# Patient Record
Sex: FEMALE | Race: WHITE | Employment: OTHER | URBAN - METROPOLITAN AREA
[De-identification: names, ages, dates, MRNs, and addresses within clinical notes are randomized per-mention and may not be internally consistent; named-entity substitution may affect disease eponyms.]

---

## 2018-05-29 ENCOUNTER — TRANSFERRED RECORDS (OUTPATIENT)
Dept: HEALTH INFORMATION MANAGEMENT | Facility: CLINIC | Age: 78
End: 2018-05-29

## 2018-10-12 ENCOUNTER — PRE VISIT (OUTPATIENT)
Dept: ORTHOPEDICS | Facility: CLINIC | Age: 78
End: 2018-10-12

## 2018-10-12 NOTE — TELEPHONE ENCOUNTER
Discussed with patient reason for visit Right hip pain  Patient prepared for appointment through the followin. Have you had any recent xrays in the last 6 months? Yes, had at [facility]     2. Have you had an MRI? No.     3. Have you had any surgery in past related to complaint?  No.  If yes, Patient advised that implant stickers are needed for any previous total joint surgery as well for appointment.     4. Are you being referred by another provider? Yes: Dr. Gill  If yes-Records in Epic.    5. Have you received the intake form in mail?.Yes.    6. Is this work comp or MVA related? No.     Susie Cornell RN

## 2018-10-16 ENCOUNTER — OFFICE VISIT (OUTPATIENT)
Dept: ORTHOPEDICS | Facility: CLINIC | Age: 78
End: 2018-10-16
Payer: COMMERCIAL

## 2018-10-16 ENCOUNTER — TELEPHONE (OUTPATIENT)
Dept: ORTHOPEDICS | Facility: CLINIC | Age: 78
End: 2018-10-16

## 2018-10-16 VITALS
BODY MASS INDEX: 31.61 KG/M2 | SYSTOLIC BLOOD PRESSURE: 160 MMHG | TEMPERATURE: 97 F | DIASTOLIC BLOOD PRESSURE: 79 MMHG | HEART RATE: 74 BPM | WEIGHT: 156.8 LBS | OXYGEN SATURATION: 97 % | RESPIRATION RATE: 16 BRPM | HEIGHT: 59 IN

## 2018-10-16 DIAGNOSIS — M16.11 PRIMARY OSTEOARTHRITIS OF RIGHT HIP: Primary | ICD-10-CM

## 2018-10-16 PROCEDURE — 99204 OFFICE O/P NEW MOD 45 MIN: CPT | Performed by: ORTHOPAEDIC SURGERY

## 2018-10-16 RX ORDER — SIMVASTATIN 20 MG
20 TABLET ORAL DAILY
Refills: 0 | COMMUNITY
Start: 2018-09-06

## 2018-10-16 RX ORDER — LISINOPRIL 40 MG/1
40 TABLET ORAL EVERY EVENING
Refills: 0 | Status: ON HOLD | COMMUNITY
Start: 2018-10-09 | End: 2019-04-18

## 2018-10-16 ASSESSMENT — PAIN SCALES - GENERAL: PAINLEVEL: MODERATE PAIN (4)

## 2018-10-16 NOTE — MR AVS SNAPSHOT
After Visit Summary   10/16/2018    Rosemary Mayberry    MRN: [id number]           Patient Information     Date Of Birth          1940        Visit Information        Provider Department      10/16/2018 10:00 AM Adriano Huang MD [facility]        Care Instructions      [facility]  [address] [place] [id number]  Phone [phone]  Fax [phone]    SURGICAL INFORMATION & INSTRUCTIONS  Dr. Adriano Huang  Name of Surgery: Right total hip arthroplasty    Date of Surgery:     If you need to reschedule/schedule your surgery, please contact Karin, our surgery scheduler at [place], at [phone].    Arrival Time:     Time of Surgery:      Please arrive early so that we can prepare you for surgery. If you arrive later than your scheduled arrival time, your surgery may be cancelled.  Please note that scheduled times may change, but you will always be notified if there is a change.     Location of Surgery:     ? [facility], [address]. [address], 3rd floor for check-in  Phone [phone]  Fax [phone]  Bring parking ticket with you for validation and reduced parking rate  [email or web address]    Prior to surgery    ? Call your insurance company  Ask if you need pre-approval for your surgery.  If pre-approval is needed, please call our surgery scheduler for assistance with the pre-approval process.   If you do not have insurance, please let us know.     ? [facility]  10 days before surgery, call [phone] to register with the surgery center. Have your insurance card ready.     Total Joint Replacement:  - Joint Replacement Class:  If you are scheduled to have a joint replacement, you (and any family/friends that will be helping to care for you) are expected to attend an educational class at  least two weeks prior to your surgery.  To register for the class please call the Patient Learning Center at [phone] or register online at [email or web address]. Classes are held at multiple locations as well as online.  You must know the date of your surgery before you can register for a class (approximate date OK). If registering online, please select hip or knee as surgery type as shoulder has not been made an option at this time.     o This is also a good opportunity to think about where you would like to have physical therapy after your surgery. You may also be able to set up appointments in advance so that everything is ready to go after surgery.    - Antibiotics Prophylaxis:  Certain procedures such as dental cleaning/work, colonoscopies and other surgeries can cause bacteria to enter the bloodstream.  These bacteria can attach to joint replacement devices.  To reduce this risk, you will need to take antibiotics before you have invasive procedures or dental work.  This is known as antibiotic prophylaxis.    - Dental Visit:  You may want to schedule an appointment with your dentist for a cleaning or needed work before your surgery.  We advise no dental work or cleaning until 6 months after your surgery with implants to hip(s), knee(s), or shoulder(s).  Once you are 6 months past your surgery, you will need to take prophylactic (preventative) antibiotics for the rest of your life before having any dental cleaning or work.  If dental work is needed before surgery, make sure everything is completely healed prior to surgery.  It may cause delays or cancellation of surgery if not healed completely. This is also for any other invasive procedures you may have such as a colonoscopy.  Please notify the clinic if you have any questions.    ? Schedule an appointment with a Primary Care Provider for a Pre-Op Physical.  This should be done within 30 days of surgery  If you do not have a primary care provider,  you may call [facility] at [phone], for an appointment.  Please have your office note and any labs or tests faxed to the facility where you are having surgery. Please be sure to request a copy of your pre-op physical and bring it with you on the day of surgery.      Tell your provider if you have any of the following:   - IF you have a pacemaker or an ICD (implanted cardiac defibrillator). If you do, please bring the ID card with you on the day of surgery  - IF you're a smoker. People who smoke have a higher risk of infection after surgery. Ask your provider how you can quit smoking.  - If you have diabetes, work with your provider to control your blood sugar. If its not well-controlled, we may need to delay surgery (or you may have problems healing afterward).  - If your surgeon asks you to see your dentist: You'll need to complete any dental work before surgery. Your dentist must send a letter to your surgery center saying it's ok to do the surgery.    ? Blood Bank Pre-Admission order (total joint replacement only):    You will need to have a Blood Type and Screen drawn at a [facility] location before your surgery.  Please check your form included in your packet to determine if it needs to be done 1-30 days before surgery or 1-3 days before surgery.    ? Pre-Op Phone Call  You will receive a pre-op phone call 1-3 days before surgery to review your eating and drinking restrictions, review medications, and confirm surgery times.      ? 7-10 days BEFORE surgery  ? Stop taking aspirin, Plavix or aspirin products 10 days before surgery or as directed by your doctor.  ? Stop taking non-steroidal anti-inflammatory medications (naproxen/Aleve, ibuprofen/Advil/Motrin, celecoxib/Celebrex, meloxicam/Mobic) 1 week before surgery or as directed by your surgeon.  This will reduce the risk of bleeding during surgery.  ? Stop taking fish oil (Omega-3-fatty acid) 1 week before surgery.  ? It is OK to take  acetaminophen (Tylenol) up until 2 hours prior to surgery.  ? Take prescription medications as directed by your doctor.  Discuss which medications to take or hold prior to your surgery, with your primary care doctor.   ? If you have diabetes, ask your primary care doctor or endocrinologist how you should take your medication(s).    ? 24 hours BEFORE surgery  Stop drinking alcohol (beer, wine, liquor) at least 24-hours before and after your surgery.     ? Evening BEFORE surgery  - You may eat a normal meal the night before surgery, but eat nothing after midnight.     - Take a shower - to help wash away bacteria (germs) from your skin.  It s normal to have bacteria on your skin and skin protects us from these germs.  When you have surgery, we cut the skin.  Sometimes germs get into the cuts and cause infection (illness caused by germs).  By following the showering instructions and using the special soap, you will lower the number of germs on your skin.  This decreases your chance of an infection.    - Buy or get 8 ounces of antiseptic surgical soap called 4% CHG.  Common brands of this soap are Hibiclens and Exidine.    - You can find it this soap at your local pharmacy, clinic or retail store.  If you have trouble finding it, ask your pharmacist to help you find the right substitute.    - Do not shave within 12 inches of your incision (surgical cut) area for at least 3 days before surgery.  Shaving can make small cuts in the skin. This puts you at a higher risk of infection.    Items you will need for each shower:   - Newly washed towel  - 4 ounces of one of the recommended soaps    Follow these instructions, the evening before surgery  - Wash your hair and body with your regular shampoo and soap. Make sure you rinse the shampoo and soap from your hair and body.  - Using clean hands, apply about 2 ounces of soap gently on your skin from the neck to your toes. Use on your groin area last. Do not use this soap on your  face or head. If you get any soap in your eyes, ears or mouth, rinse right away.  - Once the soap has been on your skin for at least 1 minute, rinse off completely and repeat washing with the surgical soap one more time.  - Rinse well and dry off using a clean towel.  If you feel any tingling, itching or other irritation, rinse right away. It is normal to feel some coolness on the skin after using the antiseptic soap. Your skin may feel a bit dry after the shower, but do not use any lotions, creams or moisturizers. Do not use hair spray or other products in your hair.  - Dress in freshly washed clothes or pajamas. Use fresh pillowcases and sheets on your bed.    ? Day of Surgery  - You may drink clear liquids up to 2 hours before surgery or as directed by your surgeon.  Clear liquids include: Water, Pedialyte, Gatorade, apple juice and liquids you can read through. Please avoid liquids that are red or orange in color.   - Do NOT drink: milk, coffee, liquids that have pulp, orange juice, and lemonade or tomato juice.   - Do NOT chew gum, chew tobacco or suck on hard candy.    - Take another shower          Follow these instructions:      - Wash your hair and body with your regular shampoo and soap. Make sure you rinse the shampoo and soap from your hair and body.  - Using clean hands, apply about 2 ounces of soap gently on your skin from the neck to your toes. Use on your groin area last. Do not use this soap on your face or head. If you get any soap in your eyes, ears or mouth, rinse right away.  - Once the soap has been on your skin for at least 1 minute, rinse off completely and repeat washing with the surgical soap one more time.  - Rinse well and dry off using a clean towel.  If you feel any tingling, itching or other irritation, rinse right away. It is normal to feel some coolness on the skin after using the antiseptic soap. Your skin may feel a bit dry after the shower, but do not use any lotions, creams or  moisturizers. Do not use hair spray or other products in your hair.  - Dress in freshly washed clothes.  - Do not wear deodorant, cologne, lotion, makeup, nail polish or jewelry to surgery.    ? If there is any change in your health PRIOR to your surgery, please contact your surgeon's office.  Such as a fever, body aches, fatigue, any flu-like symptoms, rash, or any new injury to related body part.    ? Arrange for someone to drive you home after discharge.    will need to be a responsible adult (18 years or older) that will provide transportation to and from surgery and stay in the waiting room during your surgery. You may not drive yourself or take public transportation to and from surgery.    ? Arrange for someone to stay with you for 24 hours after you go home.   This person must be a responsible adult (18 years or older).    ? Bring these items to the hospital/surgery center:   ? Insurance card(s)  ? Money for parking and co-pays, if needed  ? A list of all the medications you take, including vitamins, minerals, herbs and over the counter medications.    ? A copy of your Advance Health Care Directive, if you have one.  This tells us what treatment(s) you would or would not want, and who would make health care decisions, if you could no longer speak for yourself.    ? A case for glasses, contact lenses, hearing aids or dentures.   ? Your inhaler or CPAP machine, if you use these at home    ? Leave extra cash, jewelry and other valuables at home.       ? Other information:   Sleep Apnea: Let your nurse know if you have a history of sleep apnea, only if you are having surgery at the [facility].    When you arrive for surgery  When you get to the surgery center/hospital, you will:  - Check in. If you are under age 18, you must be with a parent or legal guardian.  - Sign consent forms, if you haven t already. These forms state that you know the risks and benefits of surgery. When you sign the  forms, you give us permission to do the surgery. Do not sign them unless you understand what will happen during and after your surgery. If you have any questions about your surgery, ask to speak with your doctor before you sign the forms. If you don t understand the answers, ask again.  - Receive a copy of the Patient s Bill of Rights. If you do not receive a copy, please ask for one.  - Change into hospital clothes. Your belongings will be placed in a bag. We will return them to you after surgery.  - Meet with the anesthesia provider. He or she will tell you what kind of anesthesia (medicine) will be used to keep you comfortable during surgery.  - Remember: it s okay to remind doctors and nurses to wash their hands before touching you.  - In most cases, your surgeon will use a marker to write his or her initials on the surgery site. This ensures that the exact site is operated on.  - For safety reasons, we will ask you the same questions many times. For example, we may ask your name and birth date over and over again.  - Friends and family can stay with you until it s time for surgery. While you re in surgery, they will be in the waiting area. Please note that cell phones are not allowed in some patient care areas.  - If you have questions about what will happen in the operating room, talk to your care team.  - You will meet with an anesthesiologist, before your surgery.  He or she may reference types of anesthesia commonly used for surgeries:   o General:  This involves the use of an IV for injection of medication and anesthesia. You are put into a sleep and have a breathing tube to assist you with breathing.  o Sedation:  You are asleep, but not so deply that you need a breathing tube.   o Local or Regional: a nerve is injected to numb the surgical area.  o Spinal: you are numbed from the waist down with medicine injected into your back.  o Femoral Nerve Block:  Anesthesia injected into the groin of leg which you  are having surgery on.      After surgery  We will move you to a recovery room, where we will watch you closely. If you have any pain or discomfort, tell your nurse. He or she will try to make you comfortable.    - We will move you to your hospital room after you are awake and stable. If you having any pain or discomfort, please let your nursing staff know.  - Please wash your hands every time you touch the wound or change bandages or dressings.  - Do not submerge the wound in water.  You may not use a bathtub, hot tub, pool, or lake until 3 months after surgery. Any open area can be a source of incoming bacteria, which can lead to infection. Keep all dressings clean and dry.   - Elevate your leg(s) as much as possible to help reduce swelling. You will also receive compression stockings for the surgical leg. Please wear these during the day and fully remove them at night. Continue to wear these for approximately 4 weeks after surgery or until your swelling has resolved.  - You may put ice on the surgical area for comfort, keeping ice on area for up to 20 minutes then off for 40 minutes.  You may do this the first few days after surgery to help reduce pain and swelling.    - Drink at least 8-10 glasses of liquid each day to help your body heal.  - Keep your lungs clear by coughing and taking deep breaths every couple hours.  This is especially important the first 48 hours after surgery.  - Typically, you will be able to start driving once you are fully off narcotics and able to do a the quick stop test (slamming on the brake) with your right leg without experiencing much pain.  - You will start physical therapy while in the hospital. Once you leave the hospital, you will need to continue going to physical therapy to maintain and improve your range of motion and strength. Please call the physical therapy clinic of your choice to set up an appointment within 1 week of being discharged from the hospital.    - Notify  your doctor if you have any of the following:   o Fever of 101 F or higher  o Numbness and/or tingling  o Increased pain, redness or swelling  o Drainage from wound  o Prolonged or uncontrolled bleeding  o Difficulty with movement    Range of Motion Restrictions  These are strict for 3 months post surgery, but should be followed for life. As your muscles gain strength, you will notice that you will have more range of motion, but your risk for dislocating remains. See patient education packet for details  - NO bending past 90 degrees at the waist (operative side)   - NO crossing your operative leg over or under your non-operative leg  - NO turning your operative leg inward     Follow-up Appointments, in Clinic  If you don't already have an appointment scheduled, please call to set up an appointment at [phone].      ? Nurse Only Appointment (2 weeks post op)  ? Post-Op appointments with provider (6 weeks post op)    Dealing with pain  A nurse will check your comfort level often during your stay. He or she will work with you to manage your pain.  It s expected that you will have pain after surgery.  Our goal is to reduce or minimize pain by:   - Remember pain is real. There are many ways to control pain. We can help you decide what works best for you.  - Ask for pain medicine when you need it.  Don t try to  tough it out --this can make you feel worse. Always take your medicine as ordered.  - Medicine doesn t work the same for everyone. If your medicine isn t working, tell your nurse. There may be other medicines or treatments we can try.  - Medication Refills.  If you need refills on your pain medication, please call the clinic as soon as possible.  It may take 72-business hours to obtain a refill.  Refills must be picked up at check-in 2, [facility] or mailed to a pharmacy of your choice.    - It is expected that you will wean off the pain medications in a timely manner.   - Constipation is  a common side effect of pain medication, decreased activity and anesthesia from surgery.  Take a stool softener as prescribed by your doctor at the time of discharge.  You may also use over the counter medications as needed.  Be sure to increase your fiber (fruits and vegetables) and your water intake.      Please call the clinic at [phone], if you experience any problems or have questions.  If you are having an emergency, always call 911 or seek immediate evaluation at the Emergency Room.    Thank you for selecting the [facility] for your care!  ---------------------------------------------          Follow-ups after your visit        Who to contact     If you have questions or need follow up information about today's clinic visit or your schedule please contact [facility] directly at [phone].  Normal or non-critical lab and imaging results will be communicated to you by [employer]art, letter or phone within 4 business days after the clinic has received the results. If you do not hear from us within 7 days, please contact the clinic through [employer] or phone. If you have a critical or abnormal lab result, we will notify you by phone as soon as possible.  Submit refill requests through [employer] or call your pharmacy and they will forward the refill request to us. Please allow 3 business days for your refill to be completed.          Additional Information About Your Visit        [employer] Information     [employer] App is an electronic gateway that provides easy, online access to your medical records. With [employer] App, you can request a clinic appointment, read your test results, renew a prescription or communicate with your care team.     To sign up for [employer] visit the website at [email or web address]   You will be asked to enter the access code listed below, as well as some personal information. Please follow the directions to create your username and password.     Your access  "code is: [email or web address]  Expires: 2019 10:38 AM     Your access code will  in 90 days. If you need help or a new code, please contact your [facility] or call [phone] for assistance.        Care EveryWhere ID     This is your Care EveryWhere ID. This could be used by other organizations to access your [facility] medical records  [id number]        Your Vitals Were     Pulse Temperature Respirations Height Pulse Oximetry BMI (Body Mass Index)    74 97  F (36.1  C) (Oral) 16 1.499 m (4' 11\") 97% 31.67 kg/m2       Blood Pressure from Last 3 Encounters:   10/16/18 160/79    Weight from Last 3 Encounters:   10/16/18 71.1 kg (156 lb 12.8 oz)              Today, you had the following     No orders found for display       Primary Care Provider Office Phone # Fax #    [facility] [phone] [address] [facility] [address] [place] [phone]        Equal Access to Services     [facility]: Hadii aad ku hadasho Soomaali, waaxda luqadaha, qaybta kaalmada adeegyada, waxay sarah hayperry dumont . So [facility] [phone].    ATENCIÓN: Si habla español, tiene a soliman disposición servicios gratuitos de asistencia lingüística. Llame al [phone].    We comply with applicable federal civil rights laws and [place] laws. We do not discriminate on the basis of race, color, national origin, age, disability, sex, sexual orientation, or gender identity.            Thank you!     Thank you for choosing [facility]  for your care. Our goal is always to provide you with excellent care. Hearing back from our patients is one way we can continue to improve our services. Please take a few minutes to complete the written survey that you may receive in the mail after your visit with us. Thank you!             Your Updated Medication List - Protect others around you: Learn how to safely use, store and throw away your " medicines at www.disposemymeds.org.          This list is accurate as of 10/16/18 10:38 AM.  Always use your most recent med list.                   Brand Name Dispense Instructions for use Diagnosis    lisinopril 40 MG tablet    PRINIVIL/ZESTRIL     Take 40 mg by mouth daily        simvastatin 20 MG tablet    ZOCOR     Take 20 mg by mouth daily

## 2018-10-16 NOTE — PROGRESS NOTES
Chief Complaint: Follow Up For of the Right Hip (Hip Pain/Limp/NPP 9/26)      Physician:  No ref. provider found    HPI: Rosemary Mayberry is a 77 year old female who presents today for evaluation of her right hip     Symptom Profile  Location of symptoms:  1) low back with radicular symptoms to the thigh, calf and 2) buttock and groin   Onset: insidious  Duration of symptoms: about a year   Quality of symptoms: sharp and burning  Severity: severe  Alleviate: activity modification   Exacerbating:  Activities   Previous Treatments: Previous treatments include activity modification, oral pain medication,     Current Status:  Results of the patient s Hip Disability and Osteoarthritis Outcome Score (HOOS)  are as follows (0-100 scales with 100 being the theoretical best):  Pain: 47.5  Symptoms:65  ADLs:69  Sports/Recreation:37.5  Quality of Life:44  ([email or web address].nu/)  [facility] Activity Score: 4    MEDICAL HISTORY: No past medical history on file.  HTN and hyperlipid    Pertinent negatives:  Patient has no history of DVT or PE. Discussed risk factors.    Medications:     Current Outpatient Prescriptions:      lisinopril (PRINIVIL/ZESTRIL) 40 MG tablet, Take 40 mg by mouth daily, Disp: , Rfl: 0     simvastatin (ZOCOR) 20 MG tablet, Take 20 mg by mouth daily, Disp: , Rfl: 0    Allergies: Review of patient's allergies indicates not on file.    SURGICAL HISTORY: No past surgical history on file.  [place]     FAMILY HISTORY: No family history on file.  Heart trouble    SOCIAL HISTORY:   Social History   Substance Use Topics     Smoking status: Never Smoker     Smokeless tobacco: Never Used     Alcohol use Not on file   retired, was a paraprofessional  Lives with spouse    REVIEW OF SYSTEMS:  The comprehensive review of systems from the intake form was reviewed with the patient.  No fever, weight change or fatigue. No dry eyes. No oral ulcers, sore throat or voice change. No palpitations, syncope, angina or edema.  No chest pain,  "excessive sleepiness, shortness of breath or hemoptysis.   No abdominal pain, nausea, vomiting, diarrhea or heartburn.  No skin rash. No focal weakness or numbness. No bleeding or lymphadenopathy. No rhinitis or hives.     Exam:  On physical examination the patient appears the stated age, is in no acute distress, affectThe is appropriate, and breathing is non-labored.  Vitals are documented in the EMR and have been reviewed:    /79 (BP Location: Left arm, Patient Position: Sitting, Cuff Size: Adult Large)  Pulse 74  Temp 97  F (36.1  C) (Oral)  Resp 16  Ht 1.499 m (4' 11\")  Wt 71.1 kg (156 lb 12.8 oz)  SpO2 97%  BMI 31.67 kg/m2  4' 11\"[Per patient[  Body mass index is 31.67 kg/(m^2).    Rises from chair: easily   Gait: significnat limp on the rigth, [place] and antalgic  Gains the exam table: easily     LEFT hip subjective: not irritated  Abd:  Add:  PFC:  Flexion: 90  IRF:10  ERF:30  Impingement test:-  Resisted straight leg raise:  DORETHA:    RIGHT hip subjective: and irritated   Abd:  Add:  PFC:  Flexion:70  IRF:-25  ERF:30  Impingement test:+ buttock and groin  Resisted straight leg raise:  DORETHA:    Distally, the circulatory, motor, and sensation exam is intact with 5/5 EHL, gastroc-soleus, and tibialis anterior.  Sensation to light touch is intact.  Dorsalis pedis and posterior tibialis pulses are palpable.  There are no sores on the feet, no bruising, and no lymphedema.    X-rays:   End-stage osteoarthritis right hip     Assessment: This is a 77 year old with endstage hip OA and severe symptoms. She also has low back pain and radiculat symptoms. We discussed that these are separate issues with separate treatments though they may overlap in terms of where they hurt and also in that lumbar spine OA affects the body's ROM at the waist, something that can contribute to higher dislocation rates in that population of patients when they have a VINNY. This may be especially so in patietns like michael " that have smaller skeletons and therefor get smaller implants. We spent twenty minutes discussing total hip arthroplasty.  We discussed the implants, the procedure, the risks and benefits, and the post-operative course.  We discussed blood clots, blood clots to the lungs, injury to blood vessels and nerves, dislocation, infection, and leg length difference.  All the patients questions were answered to the best of my ability.      Plan:  Right total hip

## 2018-10-16 NOTE — PATIENT INSTRUCTIONS
[facility]  [address] [place] [id number]  Phone [phone]  Fax [phone]    SURGICAL INFORMATION & INSTRUCTIONS  Dr. Adriano Huang  Name of Surgery: Right total hip arthroplasty    Date of Surgery:     If you need to reschedule/schedule your surgery, please contact Karin, our surgery scheduler at [place], at [phone].    Arrival Time:     Time of Surgery:      Please arrive early so that we can prepare you for surgery. If you arrive later than your scheduled arrival time, your surgery may be cancelled.  Please note that scheduled times may change, but you will always be notified if there is a change.     Location of Surgery:     ? [facility]  [address], 3rd floor for check-in  Phone [phone]  Fax [phone]  Bring parking ticket with you for validation and reduced parking rate  [email or web address]    Prior to surgery    ? Call your insurance company  Ask if you need pre-approval for your surgery.  If pre-approval is needed, please call our surgery scheduler for assistance with the pre-approval process.   If you do not have insurance, please let us know.     ? [place] for Surgery (if on [facility])  10 days before surgery, call [phone] to register with the surgery center. Have your insurance card ready.     Total Joint Replacement:  - Joint Replacement Class:  If you are scheduled to have a joint replacement, you (and any family/friends that will be helping to care for you) are expected to attend an educational class at least two weeks prior to your surgery.  To register for the class please call the Patient Learning Center at [phone] or register online at [email or web address]. Classes are held at multiple locations as well as online.  You must know the date of your surgery before you can register for a class  (approximate date OK). If registering online, please select hip or knee as surgery type as shoulder has not been made an option at this time.     o This is also a good opportunity to think about where you would like to have physical therapy after your surgery. You may also be able to set up appointments in advance so that everything is ready to go after surgery.    - Antibiotics Prophylaxis:  Certain procedures such as dental cleaning/work, colonoscopies and other surgeries can cause bacteria to enter the bloodstream.  These bacteria can attach to joint replacement devices.  To reduce this risk, you will need to take antibiotics before you have invasive procedures or dental work.  This is known as antibiotic prophylaxis.    - Dental Visit:  You may want to schedule an appointment with your dentist for a cleaning or needed work before your surgery.  We advise no dental work or cleaning until 6 months after your surgery with implants to hip(s), knee(s), or shoulder(s).  Once you are 6 months past your surgery, you will need to take prophylactic (preventative) antibiotics for the rest of your life before having any dental cleaning or work.  If dental work is needed before surgery, make sure everything is completely healed prior to surgery.  It may cause delays or cancellation of surgery if not healed completely. This is also for any other invasive procedures you may have such as a colonoscopy.  Please notify the clinic if you have any questions.    ? Schedule an appointment with a Primary Care Provider for a Pre-Op Physical.  This should be done within 30 days of surgery  If you do not have a primary care provider, you may call [facility] at [phone], for an appointment.  Please have your office note and any labs or tests faxed to the facility where you are having surgery. Please be sure to request a copy of your pre-op physical and bring it with you on the day of surgery.      Tell your provider if you  have any of the following:   - IF you have a pacemaker or an ICD (implanted cardiac defibrillator). If you do, please bring the ID card with you on the day of surgery  - IF you're a smoker. People who smoke have a higher risk of infection after surgery. Ask your provider how you can quit smoking.  - If you have diabetes, work with your provider to control your blood sugar. If its not well-controlled, we may need to delay surgery (or you may have problems healing afterward).  - If your surgeon asks you to see your dentist: You'll need to complete any dental work before surgery. Your dentist must send a letter to your surgery center saying it's ok to do the surgery.    ? Blood Bank Pre-Admission order (total joint replacement only):    You will need to have a Blood Type and Screen drawn at a [facility] location before your surgery.  Please check your form included in your packet to determine if it needs to be done 1-30 days before surgery or 1-3 days before surgery.    ? Pre-Op Phone Call  You will receive a pre-op phone call 1-3 days before surgery to review your eating and drinking restrictions, review medications, and confirm surgery times.      ? 7-10 days BEFORE surgery  ? Stop taking aspirin, Plavix or aspirin products 10 days before surgery or as directed by your doctor.  ? Stop taking non-steroidal anti-inflammatory medications (naproxen/Aleve, ibuprofen/Advil/Motrin, celecoxib/Celebrex, meloxicam/Mobic) 1 week before surgery or as directed by your surgeon.  This will reduce the risk of bleeding during surgery.  ? Stop taking fish oil (Omega-3-fatty acid) 1 week before surgery.  ? It is OK to take acetaminophen (Tylenol) up until 2 hours prior to surgery.  ? Take prescription medications as directed by your doctor.  Discuss which medications to take or hold prior to your surgery, with your primary care doctor.   ? If you have diabetes, ask your primary care doctor or endocrinologist how you should  take your medication(s).    ? 24 hours BEFORE surgery  Stop drinking alcohol (beer, wine, liquor) at least 24-hours before and after your surgery.     ? Evening BEFORE surgery  - You may eat a normal meal the night before surgery, but eat nothing after midnight.     - Take a shower - to help wash away bacteria (germs) from your skin.  It s normal to have bacteria on your skin and skin protects us from these germs.  When you have surgery, we cut the skin.  Sometimes germs get into the cuts and cause infection (illness caused by germs).  By following the showering instructions and using the special soap, you will lower the number of germs on your skin.  This decreases your chance of an infection.    - Buy or get 8 ounces of antiseptic surgical soap called 4% CHG.  Common brands of this soap are Hibiclens and Exidine.    - You can find it this soap at your local pharmacy, clinic or retail store.  If you have trouble finding it, ask your pharmacist to help you find the right substitute.    - Do not shave within 12 inches of your incision (surgical cut) area for at least 3 days before surgery.  Shaving can make small cuts in the skin. This puts you at a higher risk of infection.    Items you will need for each shower:   - Newly washed towel  - 4 ounces of one of the recommended soaps    Follow these instructions, the evening before surgery  - Wash your hair and body with your regular shampoo and soap. Make sure you rinse the shampoo and soap from your hair and body.  - Using clean hands, apply about 2 ounces of soap gently on your skin from the neck to your toes. Use on your groin area last. Do not use this soap on your face or head. If you get any soap in your eyes, ears or mouth, rinse right away.  - Once the soap has been on your skin for at least 1 minute, rinse off completely and repeat washing with the surgical soap one more time.  - Rinse well and dry off using a clean towel.  If you feel any tingling, itching or  other irritation, rinse right away. It is normal to feel some coolness on the skin after using the antiseptic soap. Your skin may feel a bit dry after the shower, but do not use any lotions, creams or moisturizers. Do not use hair spray or other products in your hair.  - Dress in freshly washed clothes or pajamas. Use fresh pillowcases and sheets on your bed.    ? Day of Surgery  - You may drink clear liquids up to 2 hours before surgery or as directed by your surgeon.  Clear liquids include: Water, Pedialyte, Gatorade, apple juice and liquids you can read through. Please avoid liquids that are red or orange in color.   - Do NOT drink: milk, coffee, liquids that have pulp, orange juice, and lemonade or tomato juice.   - Do NOT chew gum, chew tobacco or suck on hard candy.    - Take another shower          Follow these instructions:      - Wash your hair and body with your regular shampoo and soap. Make sure you rinse the shampoo and soap from your hair and body.  - Using clean hands, apply about 2 ounces of soap gently on your skin from the neck to your toes. Use on your groin area last. Do not use this soap on your face or head. If you get any soap in your eyes, ears or mouth, rinse right away.  - Once the soap has been on your skin for at least 1 minute, rinse off completely and repeat washing with the surgical soap one more time.  - Rinse well and dry off using a clean towel.  If you feel any tingling, itching or other irritation, rinse right away. It is normal to feel some coolness on the skin after using the antiseptic soap. Your skin may feel a bit dry after the shower, but do not use any lotions, creams or moisturizers. Do not use hair spray or other products in your hair.  - Dress in freshly washed clothes.  - Do not wear deodorant, cologne, lotion, makeup, nail polish or jewelry to surgery.    ? If there is any change in your health PRIOR to your surgery, please contact your surgeon's office.  Such as a  fever, body aches, fatigue, any flu-like symptoms, rash, or any new injury to related body part.    ? Arrange for someone to drive you home after discharge.    will need to be a responsible adult (18 years or older) that will provide transportation to and from surgery and stay in the waiting room during your surgery. You may not drive yourself or take public transportation to and from surgery.    ? Arrange for someone to stay with you for 24 hours after you go home.   This person must be a responsible adult (18 years or older).    ? Bring these items to the hospital/surgery center:   ? Insurance card(s)  ? Money for parking and co-pays, if needed  ? A list of all the medications you take, including vitamins, minerals, herbs and over the counter medications.    ? A copy of your Advance Health Care Directive, if you have one.  This tells us what treatment(s) you would or would not want, and who would make health care decisions, if you could no longer speak for yourself.    ? A case for glasses, contact lenses, hearing aids or dentures.   ? Your inhaler or CPAP machine, if you use these at home    ? Leave extra cash, jewelry and other valuables at home.       ? Other information:   Sleep Apnea: Let your nurse know if you have a history of sleep apnea, only if you are having surgery at the [facility].    When you arrive for surgery  When you get to the surgery center/hospital, you will:  - Check in. If you are under age 18, you must be with a parent or legal guardian.  - Sign consent forms, if you haven t already. These forms state that you know the risks and benefits of surgery. When you sign the forms, you give us permission to do the surgery. Do not sign them unless you understand what will happen during and after your surgery. If you have any questions about your surgery, ask to speak with your doctor before you sign the forms. If you don t understand the answers, ask again.  - Receive a copy  of the Patient s Bill of Rights. If you do not receive a copy, please ask for one.  - Change into hospital clothes. Your belongings will be placed in a bag. We will return them to you after surgery.  - Meet with the anesthesia provider. He or she will tell you what kind of anesthesia (medicine) will be used to keep you comfortable during surgery.  - Remember: it s okay to remind doctors and nurses to wash their hands before touching you.  - In most cases, your surgeon will use a marker to write his or her initials on the surgery site. This ensures that the exact site is operated on.  - For safety reasons, we will ask you the same questions many times. For example, we may ask your name and birth date over and over again.  - Friends and family can stay with you until it s time for surgery. While you re in surgery, they will be in the waiting area. Please note that cell phones are not allowed in some patient care areas.  - If you have questions about what will happen in the operating room, talk to your care team.  - You will meet with an anesthesiologist, before your surgery.  He or she may reference types of anesthesia commonly used for surgeries:   o General:  This involves the use of an IV for injection of medication and anesthesia. You are put into a sleep and have a breathing tube to assist you with breathing.  o Sedation:  You are asleep, but not so deply that you need a breathing tube.   o Local or Regional: a nerve is injected to numb the surgical area.  o Spinal: you are numbed from the waist down with medicine injected into your back.  o Femoral Nerve Block:  Anesthesia injected into the groin of leg which you are having surgery on.      After surgery  We will move you to a recovery room, where we will watch you closely. If you have any pain or discomfort, tell your nurse. He or she will try to make you comfortable.    - We will move you to your hospital room after you are awake and stable. If you having any  pain or discomfort, please let your nursing staff know.  - Please wash your hands every time you touch the wound or change bandages or dressings.  - Do not submerge the wound in water.  You may not use a bathtub, hot tub, pool, or lake until 3 months after surgery. Any open area can be a source of incoming bacteria, which can lead to infection. Keep all dressings clean and dry.   - Elevate your leg(s) as much as possible to help reduce swelling. You will also receive compression stockings for the surgical leg. Please wear these during the day and fully remove them at night. Continue to wear these for approximately 4 weeks after surgery or until your swelling has resolved.  - You may put ice on the surgical area for comfort, keeping ice on area for up to 20 minutes then off for 40 minutes.  You may do this the first few days after surgery to help reduce pain and swelling.    - Drink at least 8-10 glasses of liquid each day to help your body heal.  - Keep your lungs clear by coughing and taking deep breaths every couple hours.  This is especially important the first 48 hours after surgery.  - Typically, you will be able to start driving once you are fully off narcotics and able to do a the quick stop test (slamming on the brake) with your right leg without experiencing much pain.  - You will start physical therapy while in the hospital. Once you leave the hospital, you will need to continue going to physical therapy to maintain and improve your range of motion and strength. Please call the physical therapy clinic of your choice to set up an appointment within 1 week of being discharged from the hospital.    - Notify your doctor if you have any of the following:   o Fever of 101 F or higher  o Numbness and/or tingling  o Increased pain, redness or swelling  o Drainage from wound  o Prolonged or uncontrolled bleeding  o Difficulty with movement    Range of Motion Restrictions  These are strict for 3 months post surgery,  but should be followed for life. As your muscles gain strength, you will notice that you will have more range of motion, but your risk for dislocating remains. See patient education packet for details  - NO bending past 90 degrees at the waist (operative side)   - NO crossing your operative leg over or under your non-operative leg  - NO turning your operative leg inward     Follow-up Appointments, in Clinic  If you don't already have an appointment scheduled, please call to set up an appointment at [phone].      ? Nurse Only Appointment (2 weeks post op)  ? Post-Op appointments with provider (6 weeks post op)    Dealing with pain  A nurse will check your comfort level often during your stay. He or she will work with you to manage your pain.  It s expected that you will have pain after surgery.  Our goal is to reduce or minimize pain by:   - Remember pain is real. There are many ways to control pain. We can help you decide what works best for you.  - Ask for pain medicine when you need it.  Don t try to  tough it out --this can make you feel worse. Always take your medicine as ordered.  - Medicine doesn t work the same for everyone. If your medicine isn t working, tell your nurse. There may be other medicines or treatments we can try.  - Medication Refills.  If you need refills on your pain medication, please call the clinic as soon as possible.  It may take 72-business hours to obtain a refill.  Refills must be picked up at check-in 2, [facility] or mailed to a pharmacy of your choice.    - It is expected that you will wean off the pain medications in a timely manner.   - Constipation is a common side effect of pain medication, decreased activity and anesthesia from surgery.  Take a stool softener as prescribed by your doctor at the time of discharge.  You may also use over the counter medications as needed.  Be sure to increase your fiber (fruits and vegetables) and your water intake.       Please call the clinic at [phone], if you experience any problems or have questions.  If you are having an emergency, always call 911 or seek immediate evaluation at the Emergency Room.    Thank you for selecting the [facility] for your care!  ---------------------------------------------

## 2018-10-16 NOTE — TELEPHONE ENCOUNTER
Procedure: Right VINNY  Facility: [facility]  Length: 120 minute(s)  Surgeon: Sal NEWTON  Anesthesia: Choice  BMI: There is no height or weight on file to calculate BMI. (If over 43 for general or 45 for MAC cannot be scheduled at [facility])   Pre-op Appointments needed: H&P within 30 days of surgery  Post-op appointments needed: 2 weeks nurse only, 6 weeks provider only   needed:  No  Surgery packet/instructions given to patient?  Yes     Stanley Peoples RN

## 2018-10-16 NOTE — LETTER
10/16/2018         RE: Rosemary Mayberry  [address]  [place] [address]        Dear Colleague,    Thank you for referring your patient, Rosemary Mayberry, to the [facility]. Please see a copy of my visit note below.    Chief Complaint: Follow Up For of the Right Hip (Hip Pain/Limp/NPP 9/26)      Physician:  No ref. provider found    HPI: Rosemary Mayberry is a 77 year old female who presents today for evaluation of her right hip     Symptom Profile  Location of symptoms:  1) low back with radicular symptoms to the thigh, calf and 2) buttock and groin   Onset: insidious  Duration of symptoms: about a year   Quality of symptoms: sharp and burning  Severity: severe  Alleviate: activity modification   Exacerbating:  Activities   Previous Treatments: Previous treatments include activity modification, oral pain medication,     Current Status:  Results of the patient s Hip Disability and Osteoarthritis Outcome Score (HOOS)  are as follows (0-100 scales with 100 being the theoretical best):  Pain: 47.5  Symptoms:65  ADLs:69  Sports/Recreation:37.5  Quality of Life:44  (http://koos.nu/)  [facility] Activity Score: 4    MEDICAL HISTORY: No past medical history on file.  HTN and hyperlipid    Pertinent negatives:  Patient has no history of DVT or PE. Discussed risk factors.    Medications:     Current Outpatient Prescriptions:      lisinopril (PRINIVIL/ZESTRIL) 40 MG tablet, Take 40 mg by mouth daily, Disp: , Rfl: 0     simvastatin (ZOCOR) 20 MG tablet, Take 20 mg by mouth daily, Disp: , Rfl: 0    Allergies: Review of patient's allergies indicates not on file.    SURGICAL HISTORY: No past surgical history on file.  Cerclage     FAMILY HISTORY: No family history on file.  Heart trouble    SOCIAL HISTORY:   Social History   Substance Use Topics     Smoking status: Never Smoker     Smokeless tobacco: Never Used     Alcohol use Not on file   retired, was a paraprofessional  Lives with spouse    REVIEW OF  "SYSTEMS:  The comprehensive review of systems from the intake form was reviewed with the patient.  No fever, weight change or fatigue. No dry eyes. No oral ulcers, sore throat or voice change. No palpitations, syncope, angina or edema.  No chest pain, excessive sleepiness, shortness of breath or hemoptysis.   No abdominal pain, nausea, vomiting, diarrhea or heartburn.  No skin rash. No focal weakness or numbness. No bleeding or lymphadenopathy. No rhinitis or hives.     Exam:  On physical examination the patient appears the stated age, is in no acute distress, affectThe is appropriate, and breathing is non-labored.  Vitals are documented in the EMR and have been reviewed:    /79 (BP Location: Left arm, Patient Position: Sitting, Cuff Size: Adult Large)  Pulse 74  Temp 97  F (36.1  C) (Oral)  Resp 16  Ht 1.499 m (4' 11\")  Wt 71.1 kg (156 lb 12.8 oz)  SpO2 97%  BMI 31.67 kg/m2  4' 11\"[Per patient[  Body mass index is 31.67 kg/(m^2).    Rises from chair: easily   Gait: significnat limp on the rigth, [place] and antalgic  Gains the exam table: easily     LEFT hip subjective: not irritated  Abd:  Add:  PFC:  Flexion: 90  IRF:10  ERF:30  Impingement test:-  Resisted straight leg raise:  DORETHA:    RIGHT hip subjective: and irritated   Abd:  Add:  PFC:  Flexion:70  IRF:-25  ERF:30  Impingement test:+ buttock and groin  Resisted straight leg raise:  DORETHA:    Distally, the circulatory, motor, and sensation exam is intact with 5/5 EHL, gastroc-soleus, and tibialis anterior.  Sensation to light touch is intact.  Dorsalis pedis and posterior tibialis pulses are palpable.  There are no sores on the feet, no bruising, and no lymphedema.    X-rays:   End-stage osteoarthritis right hip     Assessment: This is a 77 year old with endstage hip OA and severe symptoms. She also has low back pain and radiculat symptoms. We discussed that these are separate issues with separate treatments though they may overlap in terms " of where they hurt and also in that lumbar spine OA affects the body's ROM at the waist, something that can contribute to higher dislocation rates in that population of patients when they have a VINNY. This may be especially so in patietns like michael that have smaller skeletons and therefor get smaller implants. We spent twenty minutes discussing total hip arthroplasty.  We discussed the implants, the procedure, the risks and benefits, and the post-operative course.  We discussed blood clots, blood clots to the lungs, injury to blood vessels and nerves, dislocation, infection, and leg length difference.  All the patients questions were answered to the best of my ability.      Plan:  Right total hip                             Again, thank you for allowing me to participate in the care of your patient.        Sincerely,        Adriano Huang MD

## 2018-10-16 NOTE — NURSING NOTE
"Rosemary Mayberry's chief complaint for this visit includes:  Chief Complaint   Patient presents with     Right Hip - Follow Up For     Hip Pain/Limp  NPP 9/26     PCP: Home, [facility] [place]    Referring Provider:  No referring provider defined for this encounter.    /79 (BP Location: Left arm, Patient Position: Sitting, Cuff Size: Adult Large)  Pulse 74  Temp 97  F (36.1  C) (Oral)  Resp 16  Ht 1.499 m (4' 11\")  Wt 71.1 kg (156 lb 12.8 oz)  SpO2 97%  BMI 31.67 kg/m2  Moderate Pain (4)     Do you need any medication refills at today's visit? No      "

## 2018-10-18 NOTE — TELEPHONE ENCOUNTER
The patient would like to talk with family before scheduling as she watches grandkids during the day and her children will need to make other arrangements.  Karin Aguilar, Surgery Scheduling Coordinator

## 2019-01-11 NOTE — TELEPHONE ENCOUNTER
Date Scheduled: 3-11-19   Facility: [facility]  Surgeon: Dr. Huang   Post-op appointment scheduled:   Next 5 appointments (look out 90 days)    Mar 26, 2019 10:30 AM CDT  Nurse Only with MG NURSE ONLY ORTHO  [facility] [id number] [place] [id number] [phone]           scheduled?: No  Surgery packet/instructions confirmed received?  Yes  Special Considerations:   Karin Aguilar, Surgery Scheduling Coordinator

## 2019-02-12 NOTE — TELEPHONE ENCOUNTER
The patient called wanting to reschedule to later in the spring so she could help her  with the shoveling a little longer.    Date Scheduled: 4-15-19  Facility: [facility]  Surgeon: Dr. Huang   Post-op appointment scheduled:   Next 5 appointments (look out 90 days)    May 01, 2019 10:30 AM CDT  Nurse Only with MG NURSE ONLY ORTHO  [facility] ([facility]) [address] [place] [id number] [phone]           scheduled?: No  Surgery packet/instructions confirmed received?  Yes  Special Considerations:   Karin Aguilar, Surgery Scheduling Coordinator

## 2019-03-19 RX ORDER — CEFAZOLIN SODIUM 1 G/3ML
1 INJECTION, POWDER, FOR SOLUTION INTRAMUSCULAR; INTRAVENOUS SEE ADMIN INSTRUCTIONS
Status: CANCELLED | OUTPATIENT
Start: 2019-04-15

## 2019-03-19 RX ORDER — CEFAZOLIN SODIUM 2 G/100ML
2 INJECTION, SOLUTION INTRAVENOUS
Status: CANCELLED | OUTPATIENT
Start: 2019-04-15

## 2019-03-22 ENCOUNTER — TELEPHONE (OUTPATIENT)
Dept: ORTHOPEDICS | Facility: CLINIC | Age: 79
End: 2019-03-22

## 2019-03-22 NOTE — TELEPHONE ENCOUNTER
Patient returning call. Scheduled for hip replacement 4/15 and states that she has small filling and crown replacement that needs to be done per her dentist. She states that she is uncertain as to how long it will take from beginning to end of process for dental procedure. Advised to contact her dentist regarding all of her dental concerns, but that the recommendations from this office regarding her hip replacement is to ensure all oral procedures areas are healed completely prior to surgery and no dental procedures within 6 months post operatively. Patient expressed understanding and will call her dental clinic for follow up. Kirstie Barrientos RN

## 2019-03-22 NOTE — TELEPHONE ENCOUNTER
Call to patient with no answer. Left message on voicemail stating returning her call from earlier today. Will advise patient to have dental procedure prior to surgery as soon as she is able and preferably not within week prior to surgery and not post-operatively since it is contraindicated within 6 months. Kirstie Barrientos RN

## 2019-03-22 NOTE — TELEPHONE ENCOUNTER
[facility] Call Center    Phone Message    May a detailed message be left on voicemail: yes    Reason for Call: Other: Pt is requesting a call back to disuss upcoming dental work she needs to get done. Pt states she needs a filling but needs to remove a crown for this filling. Pt states surgery is in about 3 weeks and is wondering if she should do this before or after surgery. Please advise.     Action Taken: Message routed to:  Adult Clinics: Orthopedics p [phone]

## 2019-03-26 ENCOUNTER — HOSPITAL ENCOUNTER (OUTPATIENT)
Dept: EDUCATION SERVICES | Facility: CLINIC | Age: 79
Discharge: HOME | End: 2019-03-26
Attending: ORTHOPAEDIC SURGERY | Admitting: ORTHOPAEDIC SURGERY
Payer: COMMERCIAL

## 2019-03-26 DIAGNOSIS — M16.11 PRIMARY OSTEOARTHRITIS OF RIGHT HIP: ICD-10-CM

## 2019-03-26 PROCEDURE — 86900 BLOOD TYPING SEROLOGIC ABO: CPT | Performed by: ORTHOPAEDIC SURGERY

## 2019-03-26 PROCEDURE — 86901 BLOOD TYPING SEROLOGIC RH(D): CPT | Performed by: ORTHOPAEDIC SURGERY

## 2019-03-26 PROCEDURE — 86850 RBC ANTIBODY SCREEN: CPT | Performed by: ORTHOPAEDIC SURGERY

## 2019-03-26 NOTE — PLAN OF CARE
03/26/19 1806 Tennille Salgado, RN       3/26/19 Patient(pt) and  to [facility] Pre Op Joint Replacement group class. Both very attentive,able to answer teach back,and verbalized understanding of content presented.Continue to reinforce information.See also education flow sheet.  Written material given and reviewed in group class:[facility] Class packet and Joint Replacement Book.

## 2019-04-11 RX ORDER — ASPIRIN 81 MG/1
81 TABLET ORAL DAILY
Status: ON HOLD | COMMUNITY
End: 2019-04-18

## 2019-04-14 ENCOUNTER — ANESTHESIA EVENT (OUTPATIENT)
Dept: SURGERY | Facility: CLINIC | Age: 79
DRG: 470 | End: 2019-04-14
Payer: COMMERCIAL

## 2019-04-14 NOTE — ANESTHESIA PREPROCEDURE EVALUATION
Anesthesia Pre-Procedure Evaluation    Patient: Rosemary Mayberry   MRN:     [id number] Gender:   female   Age:    78 year old :      1940        Preoperative Diagnosis: Primary Osteoarthritis Of Right Hip   Procedure(s):  Right Total Hip Arthroplasty     Past Medical History:   Diagnosis Date     Elevated hemoglobin A1c      Hyperlipidemia      Hypertension      PONV (postoperative nausea and vomiting)       Past Surgical History:   Procedure Laterality Date     CERCLAGE CERVICAL            Anesthesia Evaluation     . Pt has had prior anesthetic.     History of anesthetic complications   - PONV        ROS/MED HX    ENT/Pulmonary:  - neg pulmonary ROS     Neurologic:  - neg neurologic ROS     Cardiovascular:     (+) Dyslipidemia, hypertension----. : . . . :. .       METS/Exercise Tolerance:  >4 METS   Hematologic:  - neg hematologic  ROS       Musculoskeletal:  - neg musculoskeletal ROS       GI/Hepatic:  - neg GI/hepatic ROS       Renal/Genitourinary:  - ROS Renal section negative       Endo: Comment: BMI 33    (+) Obesity, .      Psychiatric:  - neg psychiatric ROS       Infectious Disease:  - neg infectious disease ROS       Malignancy:      - no malignancy   Other:                         PHYSICAL EXAM:   Mental Status/Neuro: A/A/O   Airway: Facies: Feasible  Mallampati: II  Mouth/Opening: Full  TM distance: > 6 cm  Neck ROM: Full   Respiratory:    CV:    Comments:                    No results found for: WBC, HGB, HCT, PLT, CRP, SED, NA, POTASSIUM, CHLORIDE, CO2, BUN, CR, GLC, ERVIN, PHOS, MAG, ALBUMIN, PROTTOTAL, ALT, AST, GGT, ALKPHOS, BILITOTAL, BILIDIRECT, LIPASE, AMYLASE, JESSI, PTT, INR, FIBR, TSH, T4, T3, HCG, HCGS, CKTOTAL, CKMB, TROPN    Preop Vitals  BP Readings from Last 3 Encounters:   10/16/18 160/79    Pulse Readings from Last 3 Encounters:   10/16/18 74      Resp Readings from Last 3 Encounters:   10/16/18 16    SpO2 Readings from Last 3 Encounters:   10/16/18 97%      Temp Readings from  "Last 1 Encounters:   10/16/18 36.1  C (97  F) (Oral)    Ht Readings from Last 1 Encounters:   10/16/18 1.499 m (4' 11\")      Wt Readings from Last 1 Encounters:   10/16/18 71.1 kg (156 lb 12.8 oz)    Estimated body mass index is 31.67 kg/m  as calculated from the following:    Height as of 10/16/18: 1.499 m (4' 11\").    Weight as of 10/16/18: 71.1 kg (156 lb 12.8 oz).     LDA:            Assessment:   ASA SCORE: 2    NPO Status: > 2 hours since completed Clear Liquids; > 6 hours since completed Solid Foods   Documentation: H&P complete; Preop Testing complete; Consents complete   Proceeding: Proceed without further delay  Tobacco Use:  NO Active use of Tobacco/UNKNOWN Tobacco use status     Plan:   Anes. Type:  General   Pre-Induction: Midazolam IV; Acetaminophen PO   Induction:  IV (Standard)   Airway: Oral ETT   Access/Monitoring: PIV   Maintenance: Balanced   Emergence: Procedure Site   Logistics: Same Day Surgery     Postop Pain/Sedation Strategy:  Standard-Options: Opioids PRN     PONV Management:  Adult Risk Factors: Female, H/o PONV or Motion Sickness, Non-Smoker, Postop Opioids  Prevention: Ondansetron; Dexamethasone     CONSENT: Direct conversation   Plan and risks discussed with: Patient          Comments for Plan/Consent:  Discussed risks, benefits, and alternatives of general anesthesia with the patient. Risks discussed included nausea/vomiting, sore throat, dental damage, soft tissue damage, problems with heart, brain, lungs, and rare allergic reactions. Patient was given a chance to ask questions. Patient consents to procedure.                            Chacha Marshall MD  "

## 2019-04-15 ENCOUNTER — ANESTHESIA (OUTPATIENT)
Dept: SURGERY | Facility: CLINIC | Age: 79
DRG: 470 | End: 2019-04-15
Payer: COMMERCIAL

## 2019-04-15 ENCOUNTER — APPOINTMENT (OUTPATIENT)
Dept: PHYSICAL THERAPY | Facility: CLINIC | Age: 79
DRG: 470 | End: 2019-04-15
Attending: ORTHOPAEDIC SURGERY
Payer: COMMERCIAL

## 2019-04-15 ENCOUNTER — APPOINTMENT (OUTPATIENT)
Dept: GENERAL RADIOLOGY | Facility: CLINIC | Age: 79
DRG: 470 | End: 2019-04-15
Attending: ORTHOPAEDIC SURGERY
Payer: COMMERCIAL

## 2019-04-15 ENCOUNTER — HOSPITAL ENCOUNTER (INPATIENT)
Facility: CLINIC | Age: 79
LOS: 3 days | Discharge: HOME | DRG: 470 | End: 2019-04-18
Attending: ORTHOPAEDIC SURGERY | Admitting: ORTHOPAEDIC SURGERY
Payer: COMMERCIAL

## 2019-04-15 DIAGNOSIS — I10 BENIGN ESSENTIAL HYPERTENSION: ICD-10-CM

## 2019-04-15 DIAGNOSIS — Z98.890 STATUS POST HIP SURGERY: Primary | ICD-10-CM

## 2019-04-15 LAB
ABO + RH BLD: NORMAL
ABO + RH BLD: NORMAL
ALBUMIN UR-MCNC: NEGATIVE MG/DL
APPEARANCE UR: CLEAR
BACTERIA #/AREA URNS HPF: ABNORMAL /HPF
BILIRUB UR QL STRIP: NEGATIVE
BLD GP AB SCN SERPL QL: NORMAL
BLOOD BANK CMNT PATIENT-IMP: NORMAL
BLOOD BANK CMNT PATIENT-IMP: NORMAL
COLOR UR AUTO: ABNORMAL
CREAT SERPL-MCNC: 1.08 MG/DL (ref 0.52–1.04)
GFR SERPL CREATININE-BSD FRML MDRD: 50 ML/MIN/{1.73_M2}
GLUCOSE SERPL-MCNC: 106 MG/DL (ref 70–99)
GLUCOSE UR STRIP-MCNC: NEGATIVE MG/DL
HGB BLD-MCNC: 12.2 G/DL (ref 11.7–15.7)
HGB UR QL STRIP: ABNORMAL
KETONES UR STRIP-MCNC: NEGATIVE MG/DL
LEUKOCYTE ESTERASE UR QL STRIP: ABNORMAL
MUCOUS THREADS #/AREA URNS LPF: PRESENT /LPF
NITRATE UR QL: NEGATIVE
PH UR STRIP: 5.5 PH (ref 5–7)
PLATELET # BLD AUTO: 200 10E9/L (ref 150–450)
RBC #/AREA URNS AUTO: 2 /HPF (ref 0–2)
SOURCE: ABNORMAL
SP GR UR STRIP: 1.01 (ref 1–1.03)
SPECIMEN EXP DATE BLD: NORMAL
SQUAMOUS #/AREA URNS AUTO: 2 /HPF (ref 0–1)
UROBILINOGEN UR STRIP-MCNC: NORMAL MG/DL (ref 0–2)
WBC #/AREA URNS AUTO: 3 /HPF (ref 0–5)

## 2019-04-15 PROCEDURE — 25800030 ZZH RX IP 258 OP 636: Performed by: ORTHOPAEDIC SURGERY

## 2019-04-15 PROCEDURE — 25000125 ZZHC RX 250: Performed by: STUDENT IN AN ORGANIZED HEALTH CARE EDUCATION/TRAINING PROGRAM

## 2019-04-15 PROCEDURE — 40000170 ZZH STATISTIC PRE-PROCEDURE ASSESSMENT II: Performed by: ORTHOPAEDIC SURGERY

## 2019-04-15 PROCEDURE — 99207 ZZC CONSULT E&M CHANGED TO SUBSEQUENT LEVEL: CPT | Performed by: INTERNAL MEDICINE

## 2019-04-15 PROCEDURE — 36415 COLL VENOUS BLD VENIPUNCTURE: CPT | Performed by: ANESTHESIOLOGY

## 2019-04-15 PROCEDURE — 25800030 ZZH RX IP 258 OP 636: Performed by: NURSE ANESTHETIST, CERTIFIED REGISTERED

## 2019-04-15 PROCEDURE — 25000566 ZZH SEVOFLURANE, EA 15 MIN: Performed by: ORTHOPAEDIC SURGERY

## 2019-04-15 PROCEDURE — 25000128 H RX IP 250 OP 636: Performed by: NURSE ANESTHETIST, CERTIFIED REGISTERED

## 2019-04-15 PROCEDURE — 36415 COLL VENOUS BLD VENIPUNCTURE: CPT | Performed by: ORTHOPAEDIC SURGERY

## 2019-04-15 PROCEDURE — 36000062 ZZH SURGERY LEVEL 4 1ST 30 MIN - UMMC: Performed by: ORTHOPAEDIC SURGERY

## 2019-04-15 PROCEDURE — 40000986 XR PELVIS AND HIP RIGHT 1 VIEW

## 2019-04-15 PROCEDURE — 25000132 ZZH RX MED GY IP 250 OP 250 PS 637: Performed by: ORTHOPAEDIC SURGERY

## 2019-04-15 PROCEDURE — 0SR902A REPLACEMENT OF RIGHT HIP JOINT WITH METAL ON POLYETHYLENE SYNTHETIC SUBSTITUTE, UNCEMENTED, OPEN APPROACH: ICD-10-PCS | Performed by: ORTHOPAEDIC SURGERY

## 2019-04-15 PROCEDURE — 85018 HEMOGLOBIN: CPT | Performed by: ANESTHESIOLOGY

## 2019-04-15 PROCEDURE — C1713 ANCHOR/SCREW BN/BN,TIS/BN: HCPCS | Performed by: ORTHOPAEDIC SURGERY

## 2019-04-15 PROCEDURE — 99232 SBSQ HOSP IP/OBS MODERATE 35: CPT | Performed by: INTERNAL MEDICINE

## 2019-04-15 PROCEDURE — 25000125 ZZHC RX 250: Performed by: ORTHOPAEDIC SURGERY

## 2019-04-15 PROCEDURE — 25000132 ZZH RX MED GY IP 250 OP 250 PS 637: Performed by: STUDENT IN AN ORGANIZED HEALTH CARE EDUCATION/TRAINING PROGRAM

## 2019-04-15 PROCEDURE — 25000128 H RX IP 250 OP 636: Performed by: ORTHOPAEDIC SURGERY

## 2019-04-15 PROCEDURE — 37000009 ZZH ANESTHESIA TECHNICAL FEE, EACH ADDTL 15 MIN: Performed by: ORTHOPAEDIC SURGERY

## 2019-04-15 PROCEDURE — 25800030 ZZH RX IP 258 OP 636: Performed by: STUDENT IN AN ORGANIZED HEALTH CARE EDUCATION/TRAINING PROGRAM

## 2019-04-15 PROCEDURE — 82947 ASSAY GLUCOSE BLOOD QUANT: CPT | Performed by: ANESTHESIOLOGY

## 2019-04-15 PROCEDURE — 27210794 ZZH OR GENERAL SUPPLY STERILE: Performed by: ORTHOPAEDIC SURGERY

## 2019-04-15 PROCEDURE — 37000008 ZZH ANESTHESIA TECHNICAL FEE, 1ST 30 MIN: Performed by: ORTHOPAEDIC SURGERY

## 2019-04-15 PROCEDURE — 97161 PT EVAL LOW COMPLEX 20 MIN: CPT | Mod: GP

## 2019-04-15 PROCEDURE — 71000014 ZZH RECOVERY PHASE 1 LEVEL 2 FIRST HR: Performed by: ORTHOPAEDIC SURGERY

## 2019-04-15 PROCEDURE — 12000001 ZZH R&B MED SURG/OB UMMC

## 2019-04-15 PROCEDURE — 25000125 ZZHC RX 250: Performed by: NURSE ANESTHETIST, CERTIFIED REGISTERED

## 2019-04-15 PROCEDURE — 97110 THERAPEUTIC EXERCISES: CPT | Mod: GP

## 2019-04-15 PROCEDURE — C1776 JOINT DEVICE (IMPLANTABLE): HCPCS | Performed by: ORTHOPAEDIC SURGERY

## 2019-04-15 PROCEDURE — 87086 URINE CULTURE/COLONY COUNT: CPT | Performed by: ORTHOPAEDIC SURGERY

## 2019-04-15 PROCEDURE — 82565 ASSAY OF CREATININE: CPT | Performed by: ORTHOPAEDIC SURGERY

## 2019-04-15 PROCEDURE — 25000125 ZZHC RX 250: Performed by: PHYSICIAN ASSISTANT

## 2019-04-15 PROCEDURE — 85049 AUTOMATED PLATELET COUNT: CPT | Performed by: ORTHOPAEDIC SURGERY

## 2019-04-15 PROCEDURE — 81001 URINALYSIS AUTO W/SCOPE: CPT | Performed by: ORTHOPAEDIC SURGERY

## 2019-04-15 PROCEDURE — 36000064 ZZH SURGERY LEVEL 4 EA 15 ADDTL MIN - UMMC: Performed by: ORTHOPAEDIC SURGERY

## 2019-04-15 PROCEDURE — 97530 THERAPEUTIC ACTIVITIES: CPT | Mod: GP

## 2019-04-15 DEVICE — IMP SHELL SNR ACET R3 3H 48MM 71335548: Type: IMPLANTABLE DEVICE | Site: HIP | Status: FUNCTIONAL

## 2019-04-15 DEVICE — IMP STEM FEM HIP SNN SYNERGY POROUS SZ 13 71306613: Type: IMPLANTABLE DEVICE | Site: HIP | Status: FUNCTIONAL

## 2019-04-15 DEVICE — IMP HEAD FEMORAL SNR COBALT 32MM +4 71303204: Type: IMPLANTABLE DEVICE | Site: HIP | Status: FUNCTIONAL

## 2019-04-15 DEVICE — IMP SCR ACET SNN SPHERICAL HEAD 6.5X30MM 71332530: Type: IMPLANTABLE DEVICE | Site: HIP | Status: FUNCTIONAL

## 2019-04-15 DEVICE — IMPLANTABLE DEVICE: Type: IMPLANTABLE DEVICE | Site: HIP | Status: FUNCTIONAL

## 2019-04-15 DEVICE — IMP SCR ACET SNN SPHERICAL HEAD 6.5X20MM 71332520: Type: IMPLANTABLE DEVICE | Site: HIP | Status: FUNCTIONAL

## 2019-04-15 RX ORDER — FENTANYL CITRATE 50 UG/ML
INJECTION, SOLUTION INTRAMUSCULAR; INTRAVENOUS PRN
Status: DISCONTINUED | OUTPATIENT
Start: 2019-04-15 | End: 2019-04-15

## 2019-04-15 RX ORDER — NALOXONE HYDROCHLORIDE 0.4 MG/ML
.1-.4 INJECTION, SOLUTION INTRAMUSCULAR; INTRAVENOUS; SUBCUTANEOUS
Status: DISCONTINUED | OUTPATIENT
Start: 2019-04-15 | End: 2019-04-15 | Stop reason: HOSPADM

## 2019-04-15 RX ORDER — DIPHENHYDRAMINE HYDROCHLORIDE 50 MG/ML
12.5 INJECTION INTRAMUSCULAR; INTRAVENOUS EVERY 6 HOURS PRN
Status: DISCONTINUED | OUTPATIENT
Start: 2019-04-15 | End: 2019-04-18 | Stop reason: HOSPADM

## 2019-04-15 RX ORDER — CLINDAMYCIN PHOSPHATE 900 MG/50ML
900 INJECTION, SOLUTION INTRAVENOUS EVERY 6 HOURS
Status: COMPLETED | OUTPATIENT
Start: 2019-04-15 | End: 2019-04-15

## 2019-04-15 RX ORDER — CLINDAMYCIN PHOSPHATE 900 MG/50ML
900 INJECTION, SOLUTION INTRAVENOUS EVERY 8 HOURS
Status: COMPLETED | OUTPATIENT
Start: 2019-04-15 | End: 2019-04-16

## 2019-04-15 RX ORDER — CELECOXIB 200 MG/1
200 CAPSULE ORAL ONCE
Status: DISCONTINUED | OUTPATIENT
Start: 2019-04-15 | End: 2019-04-15 | Stop reason: HOSPADM

## 2019-04-15 RX ORDER — FENTANYL CITRATE 50 UG/ML
25-50 INJECTION, SOLUTION INTRAMUSCULAR; INTRAVENOUS EVERY 5 MIN PRN
Status: DISCONTINUED | OUTPATIENT
Start: 2019-04-15 | End: 2019-04-15 | Stop reason: HOSPADM

## 2019-04-15 RX ORDER — GABAPENTIN 100 MG/1
100 CAPSULE ORAL
Status: COMPLETED | OUTPATIENT
Start: 2019-04-15 | End: 2019-04-15

## 2019-04-15 RX ORDER — SIMVASTATIN 20 MG
20 TABLET ORAL DAILY
Status: DISCONTINUED | OUTPATIENT
Start: 2019-04-15 | End: 2019-04-18 | Stop reason: HOSPADM

## 2019-04-15 RX ORDER — ONDANSETRON 2 MG/ML
4 INJECTION INTRAMUSCULAR; INTRAVENOUS EVERY 6 HOURS PRN
Status: DISCONTINUED | OUTPATIENT
Start: 2019-04-15 | End: 2019-04-18 | Stop reason: HOSPADM

## 2019-04-15 RX ORDER — DEXAMETHASONE SODIUM PHOSPHATE 4 MG/ML
INJECTION, SOLUTION INTRA-ARTICULAR; INTRALESIONAL; INTRAMUSCULAR; INTRAVENOUS; SOFT TISSUE PRN
Status: DISCONTINUED | OUTPATIENT
Start: 2019-04-15 | End: 2019-04-15

## 2019-04-15 RX ORDER — EPHEDRINE SULFATE 50 MG/ML
INJECTION, SOLUTION INTRAMUSCULAR; INTRAVENOUS; SUBCUTANEOUS PRN
Status: DISCONTINUED | OUTPATIENT
Start: 2019-04-15 | End: 2019-04-15

## 2019-04-15 RX ORDER — ONDANSETRON 2 MG/ML
4 INJECTION INTRAMUSCULAR; INTRAVENOUS EVERY 30 MIN PRN
Status: DISCONTINUED | OUTPATIENT
Start: 2019-04-15 | End: 2019-04-15 | Stop reason: HOSPADM

## 2019-04-15 RX ORDER — DIPHENHYDRAMINE HCL 12.5MG/5ML
12.5 LIQUID (ML) ORAL EVERY 6 HOURS PRN
Status: DISCONTINUED | OUTPATIENT
Start: 2019-04-15 | End: 2019-04-18 | Stop reason: HOSPADM

## 2019-04-15 RX ORDER — SODIUM CHLORIDE, SODIUM LACTATE, POTASSIUM CHLORIDE, CALCIUM CHLORIDE 600; 310; 30; 20 MG/100ML; MG/100ML; MG/100ML; MG/100ML
INJECTION, SOLUTION INTRAVENOUS CONTINUOUS PRN
Status: DISCONTINUED | OUTPATIENT
Start: 2019-04-15 | End: 2019-04-15

## 2019-04-15 RX ORDER — SODIUM CHLORIDE, SODIUM LACTATE, POTASSIUM CHLORIDE, CALCIUM CHLORIDE 600; 310; 30; 20 MG/100ML; MG/100ML; MG/100ML; MG/100ML
INJECTION, SOLUTION INTRAVENOUS CONTINUOUS
Status: DISCONTINUED | OUTPATIENT
Start: 2019-04-15 | End: 2019-04-16

## 2019-04-15 RX ORDER — HYDROMORPHONE HYDROCHLORIDE 1 MG/ML
.3-.5 INJECTION, SOLUTION INTRAMUSCULAR; INTRAVENOUS; SUBCUTANEOUS
Status: DISCONTINUED | OUTPATIENT
Start: 2019-04-15 | End: 2019-04-18 | Stop reason: HOSPADM

## 2019-04-15 RX ORDER — HYDROMORPHONE HYDROCHLORIDE 1 MG/ML
.3-.5 INJECTION, SOLUTION INTRAMUSCULAR; INTRAVENOUS; SUBCUTANEOUS EVERY 10 MIN PRN
Status: DISCONTINUED | OUTPATIENT
Start: 2019-04-15 | End: 2019-04-15 | Stop reason: HOSPADM

## 2019-04-15 RX ORDER — BISACODYL 10 MG
10 SUPPOSITORY, RECTAL RECTAL DAILY
Status: DISCONTINUED | OUTPATIENT
Start: 2019-04-16 | End: 2019-04-17

## 2019-04-15 RX ORDER — SODIUM CHLORIDE, SODIUM LACTATE, POTASSIUM CHLORIDE, CALCIUM CHLORIDE 600; 310; 30; 20 MG/100ML; MG/100ML; MG/100ML; MG/100ML
INJECTION, SOLUTION INTRAVENOUS CONTINUOUS
Status: DISCONTINUED | OUTPATIENT
Start: 2019-04-15 | End: 2019-04-15 | Stop reason: HOSPADM

## 2019-04-15 RX ORDER — LIDOCAINE 40 MG/G
CREAM TOPICAL
Status: DISCONTINUED | OUTPATIENT
Start: 2019-04-15 | End: 2019-04-18 | Stop reason: HOSPADM

## 2019-04-15 RX ORDER — AMOXICILLIN 250 MG
2 CAPSULE ORAL 2 TIMES DAILY
Status: DISCONTINUED | OUTPATIENT
Start: 2019-04-15 | End: 2019-04-17

## 2019-04-15 RX ORDER — ONDANSETRON 4 MG/1
4 TABLET, ORALLY DISINTEGRATING ORAL EVERY 30 MIN PRN
Status: DISCONTINUED | OUTPATIENT
Start: 2019-04-15 | End: 2019-04-15 | Stop reason: HOSPADM

## 2019-04-15 RX ORDER — OXYCODONE HYDROCHLORIDE 5 MG/1
5-10 TABLET ORAL EVERY 4 HOURS PRN
Status: DISCONTINUED | OUTPATIENT
Start: 2019-04-15 | End: 2019-04-18 | Stop reason: HOSPADM

## 2019-04-15 RX ORDER — ONDANSETRON 2 MG/ML
INJECTION INTRAMUSCULAR; INTRAVENOUS PRN
Status: DISCONTINUED | OUTPATIENT
Start: 2019-04-15 | End: 2019-04-15

## 2019-04-15 RX ORDER — ACETAMINOPHEN 325 MG/1
650 TABLET ORAL EVERY 4 HOURS PRN
Status: DISCONTINUED | OUTPATIENT
Start: 2019-04-18 | End: 2019-04-18 | Stop reason: HOSPADM

## 2019-04-15 RX ORDER — ACETAMINOPHEN 325 MG/1
975 TABLET ORAL EVERY 8 HOURS
Status: DISPENSED | OUTPATIENT
Start: 2019-04-15 | End: 2019-04-18

## 2019-04-15 RX ORDER — ACETAMINOPHEN 325 MG/1
975 TABLET ORAL ONCE
Status: COMPLETED | OUTPATIENT
Start: 2019-04-15 | End: 2019-04-15

## 2019-04-15 RX ORDER — MAGNESIUM HYDROXIDE 1200 MG/15ML
LIQUID ORAL PRN
Status: DISCONTINUED | OUTPATIENT
Start: 2019-04-15 | End: 2019-04-15 | Stop reason: HOSPADM

## 2019-04-15 RX ORDER — POLYETHYLENE GLYCOL 3350 17 G/17G
17 POWDER, FOR SOLUTION ORAL DAILY
Status: DISCONTINUED | OUTPATIENT
Start: 2019-04-15 | End: 2019-04-17

## 2019-04-15 RX ORDER — METHOCARBAMOL 500 MG/1
500 TABLET, FILM COATED ORAL 4 TIMES DAILY PRN
Status: DISCONTINUED | OUTPATIENT
Start: 2019-04-15 | End: 2019-04-18 | Stop reason: HOSPADM

## 2019-04-15 RX ORDER — AMOXICILLIN 250 MG
1 CAPSULE ORAL 2 TIMES DAILY
Status: DISCONTINUED | OUTPATIENT
Start: 2019-04-15 | End: 2019-04-17

## 2019-04-15 RX ORDER — ONDANSETRON 4 MG/1
4 TABLET, ORALLY DISINTEGRATING ORAL EVERY 6 HOURS PRN
Status: DISCONTINUED | OUTPATIENT
Start: 2019-04-15 | End: 2019-04-18 | Stop reason: HOSPADM

## 2019-04-15 RX ORDER — PROPOFOL 10 MG/ML
INJECTION, EMULSION INTRAVENOUS PRN
Status: DISCONTINUED | OUTPATIENT
Start: 2019-04-15 | End: 2019-04-15

## 2019-04-15 RX ORDER — LIDOCAINE 4 G/G
1-3 PATCH TOPICAL
Status: DISCONTINUED | OUTPATIENT
Start: 2019-04-16 | End: 2019-04-18 | Stop reason: HOSPADM

## 2019-04-15 RX ORDER — NALOXONE HYDROCHLORIDE 0.4 MG/ML
.1-.4 INJECTION, SOLUTION INTRAMUSCULAR; INTRAVENOUS; SUBCUTANEOUS
Status: DISCONTINUED | OUTPATIENT
Start: 2019-04-15 | End: 2019-04-18 | Stop reason: HOSPADM

## 2019-04-15 RX ORDER — LISINOPRIL 20 MG/1
40 TABLET ORAL EVERY EVENING
Status: DISCONTINUED | OUTPATIENT
Start: 2019-04-17 | End: 2019-04-15

## 2019-04-15 RX ADMIN — SODIUM CHLORIDE, POTASSIUM CHLORIDE, SODIUM LACTATE AND CALCIUM CHLORIDE: 600; 310; 30; 20 INJECTION, SOLUTION INTRAVENOUS at 07:09

## 2019-04-15 RX ADMIN — FENTANYL CITRATE 25 MCG: 50 INJECTION, SOLUTION INTRAMUSCULAR; INTRAVENOUS at 08:12

## 2019-04-15 RX ADMIN — SODIUM CHLORIDE, POTASSIUM CHLORIDE, SODIUM LACTATE AND CALCIUM CHLORIDE: 600; 310; 30; 20 INJECTION, SOLUTION INTRAVENOUS at 08:50

## 2019-04-15 RX ADMIN — ACETAMINOPHEN 975 MG: 325 TABLET, FILM COATED ORAL at 21:45

## 2019-04-15 RX ADMIN — SODIUM CHLORIDE, POTASSIUM CHLORIDE, SODIUM LACTATE AND CALCIUM CHLORIDE: 600; 310; 30; 20 INJECTION, SOLUTION INTRAVENOUS at 12:31

## 2019-04-15 RX ADMIN — ACETAMINOPHEN 975 MG: 325 TABLET, FILM COATED ORAL at 14:06

## 2019-04-15 RX ADMIN — SODIUM CHLORIDE 1 G: 9 INJECTION, SOLUTION INTRAVENOUS at 09:59

## 2019-04-15 RX ADMIN — PROPOFOL 150 MG: 10 INJECTION, EMULSION INTRAVENOUS at 07:39

## 2019-04-15 RX ADMIN — Medication 10 MG: at 08:01

## 2019-04-15 RX ADMIN — PHENYLEPHRINE HYDROCHLORIDE 100 MCG: 10 INJECTION, SOLUTION INTRAMUSCULAR; INTRAVENOUS; SUBCUTANEOUS at 09:54

## 2019-04-15 RX ADMIN — SUGAMMADEX 200 MG: 100 INJECTION, SOLUTION INTRAVENOUS at 10:11

## 2019-04-15 RX ADMIN — SIMVASTATIN 20 MG: 20 TABLET, FILM COATED ORAL at 22:01

## 2019-04-15 RX ADMIN — ACETAMINOPHEN 975 MG: 325 TABLET, FILM COATED ORAL at 06:36

## 2019-04-15 RX ADMIN — HYDROMORPHONE HYDROCHLORIDE 0.25 MG: 1 INJECTION, SOLUTION INTRAMUSCULAR; INTRAVENOUS; SUBCUTANEOUS at 08:29

## 2019-04-15 RX ADMIN — MENTHOL 1 PATCH: 205.5 PATCH TOPICAL at 14:03

## 2019-04-15 RX ADMIN — DEXAMETHASONE SODIUM PHOSPHATE 8 MG: 4 INJECTION, SOLUTION INTRAMUSCULAR; INTRAVENOUS at 07:58

## 2019-04-15 RX ADMIN — HYDROMORPHONE HYDROCHLORIDE 0.25 MG: 1 INJECTION, SOLUTION INTRAMUSCULAR; INTRAVENOUS; SUBCUTANEOUS at 09:19

## 2019-04-15 RX ADMIN — FENTANYL CITRATE 75 MCG: 50 INJECTION, SOLUTION INTRAMUSCULAR; INTRAVENOUS at 07:39

## 2019-04-15 RX ADMIN — CLINDAMYCIN PHOSPHATE 900 MG: 18 INJECTION, SOLUTION INTRAVENOUS at 07:50

## 2019-04-15 RX ADMIN — GABAPENTIN 100 MG: 100 CAPSULE ORAL at 06:36

## 2019-04-15 RX ADMIN — MENTHOL 1 PATCH: 205.5 PATCH TOPICAL at 22:02

## 2019-04-15 RX ADMIN — ONDANSETRON 4 MG: 2 INJECTION INTRAMUSCULAR; INTRAVENOUS at 10:11

## 2019-04-15 RX ADMIN — Medication 5 MG: at 08:55

## 2019-04-15 RX ADMIN — SODIUM CHLORIDE, POTASSIUM CHLORIDE, SODIUM LACTATE AND CALCIUM CHLORIDE: 600; 310; 30; 20 INJECTION, SOLUTION INTRAVENOUS at 15:58

## 2019-04-15 RX ADMIN — PHENYLEPHRINE HYDROCHLORIDE 200 MCG: 10 INJECTION, SOLUTION INTRAMUSCULAR; INTRAVENOUS; SUBCUTANEOUS at 10:16

## 2019-04-15 RX ADMIN — SODIUM CHLORIDE 1 G: 9 INJECTION, SOLUTION INTRAVENOUS at 08:02

## 2019-04-15 RX ADMIN — PHENYLEPHRINE HYDROCHLORIDE 200 MCG: 10 INJECTION, SOLUTION INTRAMUSCULAR; INTRAVENOUS; SUBCUTANEOUS at 10:03

## 2019-04-15 RX ADMIN — FENTANYL CITRATE 50 MCG: 50 INJECTION, SOLUTION INTRAMUSCULAR; INTRAVENOUS at 10:39

## 2019-04-15 RX ADMIN — FENTANYL CITRATE 50 MCG: 50 INJECTION, SOLUTION INTRAMUSCULAR; INTRAVENOUS at 10:28

## 2019-04-15 RX ADMIN — ROCURONIUM BROMIDE 10 MG: 10 INJECTION INTRAVENOUS at 09:18

## 2019-04-15 RX ADMIN — Medication 5 MG: at 09:36

## 2019-04-15 RX ADMIN — ROCURONIUM BROMIDE 50 MG: 10 INJECTION INTRAVENOUS at 07:39

## 2019-04-15 RX ADMIN — FAMOTIDINE 20 MG: 10 INJECTION, SOLUTION INTRAVENOUS at 10:03

## 2019-04-15 RX ADMIN — PHENYLEPHRINE HYDROCHLORIDE 100 MCG: 10 INJECTION, SOLUTION INTRAMUSCULAR; INTRAVENOUS; SUBCUTANEOUS at 09:42

## 2019-04-15 RX ADMIN — Medication 5 MG: at 08:51

## 2019-04-15 RX ADMIN — CLINDAMYCIN IN 5 PERCENT DEXTROSE 900 MG: 18 INJECTION, SOLUTION INTRAVENOUS at 17:07

## 2019-04-15 RX ADMIN — PHENYLEPHRINE HYDROCHLORIDE 200 MCG: 10 INJECTION, SOLUTION INTRAMUSCULAR; INTRAVENOUS; SUBCUTANEOUS at 09:49

## 2019-04-15 RX ADMIN — SENNOSIDES AND DOCUSATE SODIUM 1 TABLET: 8.6; 5 TABLET ORAL at 19:51

## 2019-04-15 ASSESSMENT — MIFFLIN-ST. JEOR: SCORE: 1086.63

## 2019-04-15 ASSESSMENT — ACTIVITIES OF DAILY LIVING (ADL)
TOILETING: 0-->INDEPENDENT
DRESS: 0-->INDEPENDENT
ADLS_ACUITY_SCORE: 12
FALL_HISTORY_WITHIN_LAST_SIX_MONTHS: NO
SWALLOWING: 0-->SWALLOWS FOODS/LIQUIDS WITHOUT DIFFICULTY
BATHING: 0-->INDEPENDENT
ADLS_ACUITY_SCORE: 12
TRANSFERRING: 0-->INDEPENDENT
RETIRED_EATING: 0-->INDEPENDENT
ADLS_ACUITY_SCORE: 14
COGNITION: 0 - NO COGNITION ISSUES REPORTED
AMBULATION: 0-->INDEPENDENT
RETIRED_COMMUNICATION: 0-->UNDERSTANDS/COMMUNICATES WITHOUT DIFFICULTY

## 2019-04-15 NOTE — ANESTHESIA CARE TRANSFER NOTE
Patient: Rosemary Mayberry    Procedure(s):  Right Total Hip Arthroplasty    Diagnosis: Primary Osteoarthritis Of Right Hip  Diagnosis Additional Information: No value filed.    Anesthesia Type:   No value filed.     Note:  Airway :Face Mask  Patient transferred to:PACU  Comments: Pt transferred to PACU. Extubated in OR and spontaneously breathing with sufficient gas exchange. On 6L O2 via FM. No airway. VSS. Normothermic. Report to RN at bedside.   ERVIN Hoover CRNA Handoff Report: Identified the Reponsible Provider, Reviewed the pertinent medical history, Discussed the surgical course, Reviewed Intra-OP anesthesia mangement and issues during anesthesia, Set expectations for post-procedure period, Allowed opportunity for questions and acknowledgement of understanding and Identifed the Patient      Vitals: (Last set prior to Anesthesia Care Transfer)    CRNA VITALS  4/15/2019 1001 - 4/15/2019 1041      4/15/2019             Resp Rate (observed):  1  (Abnormal)                 Electronically Signed By: ERVIN Valdivia CRNA  April 15, 2019  10:41 AM

## 2019-04-15 NOTE — OR NURSING
PACU to Inpatient Nursing Handoff    Patient Rosemary Mayberry is a 78 year old female who speaks English.   Procedure Procedure(s):  Right Total Hip Arthroplasty   Surgeon(s) Primary: Adriano Huang MD  Assisting: Chris Wheeler PA-C     Allergies   Allergen Reactions     Cephalexin Itching       Isolation  [unfilled]     Past Medical History   has a past medical history of Elevated hemoglobin A1c, Hyperlipidemia, Hypertension, and PONV (postoperative nausea and vomiting). She also has no past medical history of Complication of anesthesia or Sleep apnea.    Anesthesia General   Dermatome Level     Preop Meds acetaminophen (Tylenol) - time given: 0636  gabapentin (Neurontin) - time given: 0636   Nerve block Not applicable   Intraop Meds dexamethasone (Decadron)  fentanyl (Sublimaze): 200 mcg total  hydromorphone (Dilaudid): 0.5 mg total  ondansetron (Zofran): last given at 1011  pepcid 20 mg at 1003   Local Meds Yes - Local Cocktail (morphine, ropivacaine, epinephrine, Toradol)   Antibiotics clindamycin (Cleocin) - last given at 0750     Pain Patient Currently in Pain: yes  Comfort: tolerable with discomfort  Pain Control: fully effective   PACU meds  Not applicable   PCA / epidural No   Capnography Respiratory Monitoring (EtCO2): 31 mmHg  Integrated Pulmonary Index (IPI): 7   Telemetry ECG Rhythm: Normal sinus rhythm   Inpatient Telemetry Monitor Ordered? No        Labs Glucose Lab Results   Component Value Date     04/15/2019       Hgb Lab Results   Component Value Date    HGB 12.2 04/15/2019       INR No results found for: INR   PACU Imaging Completed     Wound/Incision Incision/Surgical Site 04/15/19 Right Hip (Active)   Incision Assessment UTV 4/15/2019 11:22 AM   Closure LIOR 4/15/2019 11:22 AM   Incision Drainage Amount UTV 4/15/2019 11:22 AM   Incision Care Normal saline 4/15/2019  9:43 AM   Dressing Intervention Clean, dry, intact 4/15/2019 11:22 AM   Number of days: 0      CMS         Equipment ice pack and abductor pillow   Other LDA ETT (adult) 7 (Active)   Number of days: 0        IV Access Peripheral IV 04/15/19 Left Hand (Active)   Site Assessment WDL 4/15/2019 11:23 AM   Line Status Infusing 4/15/2019 11:23 AM   Phlebitis Scale 0-->no symptoms 4/15/2019 11:23 AM   Infiltration Scale 0 4/15/2019 11:23 AM   Infiltration Site Treatment Method  None 4/15/2019 11:23 AM   Extravasation? No 4/15/2019 11:23 AM   Dressing Intervention New dressing  4/15/2019  6:52 AM   Number of days: 0      Blood Products Not applicable EBL 3   mL   Intake/Output Date 04/15/19 0700 - 04/16/19 0659   Shift 7868-4140 6685-7478 6879-3663 24 Hour Total   INTAKE   I.V. 1600   1600   Shift Total(mL/kg) 1600(23.16)   1600(23.16)   OUTPUT   Blood 3   3   Shift Total(mL/kg) 3(0.04)   3(0.04)   Weight (kg) 69.1 69.1 69.1 69.1      Drains / [place]     Time of void PreOp Void Prior to Procedure: 0600 (04/15/19 0638)    PostOp      Diapered? No   Bladder Scan     PO    tolerating sips     Vitals    B/P: 113/65  T: 97.7  F (36.5  C)    Temp src: Oral  P:  Pulse: 77 (04/15/19 1115)    Heart Rate: 70 (04/15/19 1115)     R: 10  O2:  SpO2: 97 %    O2 Device: Nasal cannula (04/15/19 1126)    Oxygen Delivery: 2 LPM (04/15/19 1100)         Family/support present significant other   Patient belongings     Patient transported on bed   DC meds/scripts (obs/outpt) Not applicable   Inpatient Pain Meds Released? Yes       Special needs/considerations None   Tasks needing completion   none       Felecia Koenig, RN  [facility] [id number]

## 2019-04-15 NOTE — PROGRESS NOTES
04/15/19 1459   Living Environment   Lives With spouse   Living Arrangements house   Home Accessibility no concerns   Transportation Anticipated family or friend will provide   Self-Care   Usual Activity Tolerance good   Current Activity Tolerance moderate   Regular Exercise Yes   Activity/Exercise Type strength training   Exercise Amount/Frequency 3-5 times/wk   Equipment Currently Used at Home walker, rolling   Activity/Exercise/Self-Care Comment lifting 2 and 5 pounds with exercises   Functional Level Prior   Ambulation 0-->independent   Transferring 0-->independent   Toileting 0-->independent   Bathing 0-->independent   Communication 0-->understands/communicates without difficulty   Swallowing 0-->swallows foods/liquids without difficulty   Cognition 0 - no cognition issues reported   Fall history within last six months no   Which of the above functional risks had a recent onset or change? ambulation;transferring   General Information   Onset of Illness/Injury or Date of Surgery - Date 04/15/19   Referring Physician Adriano Huang MD   Patient/Family Goals Statement To get rid of limp   Pertinent History of Current Problem (include personal factors and/or comorbidities that impact the POC) 77 yo female S/P R VINNY   Precautions/Limitations fall precautions;right hip precautions   Weight-Bearing Status - LUE full weight-bearing   Weight-Bearing Status - RUE full weight-bearing   Weight-Bearing Status - LLE full weight-bearing   Weight-Bearing Status - RLE weight-bearing as tolerated   General Observations Pt up in bed upon arrival is pleasant and agreeable to PT   General Info Comments IV, Capno, BP    Cognitive Status Examination   Orientation orientation to person, place and time   Level of Consciousness alert   Follows Commands and Answers Questions 100% of the time   Personal Safety and Judgment intact   Memory intact   Pain Assessment   Patient Currently in Pain Yes, see Vital Sign flowsheet    Integumentary/Edema   Integumentary/Edema Comments Typical post surgical swelling   Posture    Posture Forward head position;Kyphosis   Range of Motion (ROM)   ROM Comment Not formally tested but able to complete functional tasks   Strength   Strength Comments Not formally tested but able to complete functional tasks and has good muscle contraction with exercises   Bed Mobility   Bed Mobility Comments Supine>sit SBA. Sit>supine min assist for R LE and maintaining hip precautions.    Transfer Skills   Transfer Comments Sit<>stand SBA   Gait   Gait Comments Pt ambulated 150' with CGA and rolling walker. Pt able to ambulate with step through gait pattern but had a mild antalgic gait patter   Balance   Balance Comments Not formally assesed but able to stand unsupported without a major LOB, good seated balance as well.    Sensory Examination   Sensory Perception Comments Pt denies any numbness or tingling in extremities   General Therapy Interventions   Planned Therapy Interventions bed mobility training;gait training;ROM;strengthening;stretching;home program guidelines   Clinical Impression   Criteria for Skilled Therapeutic Intervention yes, treatment indicated   PT Diagnosis Decreased functional independence   Influenced by the following impairments Pain, decreased strength and ROM in R LE, Posterior hip precautions   Functional limitations due to impairments bed mobility, ambulation, transfers   Clinical Presentation Stable/Uncomplicated   Clinical Presentation Rationale Pt is mobilizing well and does not have complex comorbidities    Clinical Decision Making (Complexity) Low complexity   Therapy Frequency` 2 times/day   Predicted Duration of Therapy Intervention (days/wks) 3 days   Anticipated Discharge Disposition Home with Assist;Home with Outpatient Therapy   Risk & Benefits of therapy have been explained Yes   Patient, Family & other staff in agreement with plan of care Yes   Total Evaluation Time   Total  Evaluation Time (Minutes) 8

## 2019-04-15 NOTE — PLAN OF CARE
"  VS: /48   Pulse 66   Temp 95.9  F (35.5  C) (Oral)   Resp 10   Ht 1.499 m (4' 11\")   Wt 69.1 kg (152 lb 5.4 oz)   SpO2 95%   BMI 30.77 kg/m       O2: 2 LPM, CAPNO   Output: Due to void, 235 bladder scan at 1445   Last BM: 4/15   Activity: WBAT, up to bedside comode   Up for meals? Too soon   Skin: Incision   Pain: Negligible, ice and tylenol given   CMS: Intact   Dressing: CDI   Diet: Regular   LDA: PIV infusing   Equipment: IV pump and poll, PCD, CAPNO   Plan: Discharge home   Additional Info:        "

## 2019-04-15 NOTE — OR NURSING
"Arrived PACU per bed.  Pt waking up to voice and moves all fours.  States \"yes\"  to pain question and medicated per CRNA.  Breath sounds with bilateral fine rales heard anterior chest.  Dressing dry and intact.  "

## 2019-04-15 NOTE — PROGRESS NOTES
SPIRITUAL HEALTH SERVICES  [facility] ([facility]) 3C   PRE-SURGERY VISIT    Had pre-surgery visit with pt's  (Jonathon) while Rosemary was in surgery.  Provided reflective listening and spiritual support.     Plan: I will follow up once Rosemary has been admitted to Unit 8A    Mimi Cameron  Chaplain Resident  Pager  [phone]

## 2019-04-15 NOTE — CONSULTS
HOSPITALIST INITIAL CONSULT NOTE    Referring Provider:  Adriano Huang MD      Reason for Consult         History of Present Illness     Rosemary Mayberry is 78 year old year old female with hx of HTN, Hyperlipidemia is being admitted after Right hip Arthroplasty on 04/15/2019.  ml    Currently, patient reports mild aching pain which is very tolerable. She denies any nausea, vomiting, chest pain, shortness of breath, fever, chills, lightheadedness or dizziness.           Past Medical History     Past Medical History:   Diagnosis Date     Elevated hemoglobin A1c      Hyperlipidemia      Hypertension      PONV (postoperative nausea and vomiting)           Past Surgical History     Past Surgical History:   Procedure Laterality Date     CERCLAGE CERVICAL            Medications     All his current medications are reviewed in current medication section of Psychiatric.  Home medications are reviewed.  Current Facility-Administered Medications   Medication     [START ON 4/18/2019] acetaminophen (TYLENOL) tablet 650 mg     acetaminophen (TYLENOL) tablet 975 mg     benzocaine-menthol (CEPACOL) 15-3.6 MG lozenge 1-2 lozenge     [START ON 4/16/2019] bisacodyl (DULCOLAX) Suppository 10 mg     clindamycin (CLEOCIN) infusion 900 mg     diphenhydrAMINE (BENADRYL) solution 12.5 mg    Or     diphenhydrAMINE (BENADRYL) injection 12.5 mg     [START ON 4/16/2019] enoxaparin (LOVENOX) injection 40 mg     HYDROmorphone (PF) (DILAUDID) injection 0.3-0.5 mg     lactated ringers infusion     [START ON 4/16/2019] Lidocaine (LIDOCARE) 4 % Patch 1-3 patch     lidocaine (LMX4) cream     lidocaine 1 % 1 mL     lidocaine patch in PLACE     lidocaine patch REMOVAL     magnesium hydroxide (MILK OF MAGNESIA) suspension 15 mL     [START ON 4/16/2019] melatonin tablet 1 mg     menthol (ICY HOT) 5 % patch 1 patch    And     menthol (ICY HOT) Patch in Place    And     [START ON 4/16/2019] menthol (ICY HOT) patch REMOVAL     methocarbamol (ROBAXIN)  tablet 500 mg     naloxone (NARCAN) injection 0.1-0.4 mg     ondansetron (ZOFRAN-ODT) ODT tab 4 mg    Or     ondansetron (ZOFRAN) injection 4 mg     oxyCODONE (ROXICODONE) tablet 5-10 mg     polyethylene glycol (MIRALAX/GLYCOLAX) Packet 17 g     senna-docusate (SENOKOT-S/PERICOLACE) 8.6-50 MG per tablet 1 tablet    Or     senna-docusate (SENOKOT-S/PERICOLACE) 8.6-50 MG per tablet 2 tablet     simvastatin (ZOCOR) tablet 20 mg     sodium chloride (PF) 0.9% PF flush 3 mL     sodium chloride (PF) 0.9% PF flush 3 mL     [START ON 4/16/2019] sodium phosphate (FLEET ENEMA) 1 enema        Allergies        Allergies   Allergen Reactions     Cephalexin Itching        Family History     History reviewed. No pertinent family history.       Social History     Social History     Socioeconomic History     Marital status:      Spouse name: Not on file     Number of children: Not on file     Years of education: Not on file     Highest education level: Not on file   Occupational History     Not on file   Social Needs     Financial resource strain: Not on file     Food insecurity:     Worry: Not on file     Inability: Not on file     Transportation needs:     Medical: Not on file     Non-medical: Not on file   Tobacco Use     Smoking status: Never Smoker     Smokeless tobacco: Never Used   Substance and Sexual Activity     Alcohol use: Yes     Comment: occ     Drug use: Never     Sexual activity: Not on file   Lifestyle     Physical activity:     Days per week: Not on file     Minutes per session: Not on file     Stress: Not on file   Relationships     Social connections:     Talks on phone: Not on file     Gets together: Not on file     Attends Sikhism service: Not on file     Active member of club or organization: Not on file     Attends meetings of clubs or organizations: Not on file     Relationship status: Not on file     Intimate partner violence:     Fear of current or ex partner: Not on file     Emotionally abused:  "Not on file     Physically abused: Not on file     Forced sexual activity: Not on file   Other Topics Concern     Not on file   Social History Narrative     Not on file      Review of Systems   A 10 point review of systems was taken and largely negative except for that which was stated above.      Physical Exam       Vital signs:    Blood pressure 109/48, pulse 66, temperature 95.9  F (35.5  C), temperature source Oral, resp. rate 10, height 1.499 m (4' 11\"), weight 69.1 kg (152 lb 5.4 oz), SpO2 95 %.  Estimated body mass index is 30.77 kg/m  as calculated from the following:    Height as of this encounter: 1.499 m (4' 11\").    Weight as of this encounter: 69.1 kg (152 lb 5.4 oz).      Intake/Output Summary (Last 24 hours) at 4/15/2019 1422  Last data filed at 4/15/2019 1156  Gross per 24 hour   Intake 1837 ml   Output 3 ml   Net 1834 ml      HEENT: No icterus, no pallor  Cardiovascular: S1, S2 normal  Respiratory: B/L CTA  Abdomen: Soft, NT, BS+  Neurology: Alert, awake, and oriented  Extremities: Right hip dressing in place.        Laboratory and Imaging Studies     Laboratory and Imaging studies reviewed in the results review section of Epic. Pertinent studies are as below:    CMP  Recent Labs   Lab 04/15/19  1306 04/15/19  0640   GLC  --  106*   CR 1.08*  --    GFRESTIMATED 50*  --    GFRESTBLACK 58*  --      CBC  Recent Labs   Lab 04/15/19  1306 04/15/19  0640   HGB  --  12.2     --      INRNo lab results found in last 7 days.  Arterial Blood GasNo lab results found in last 7 days.       Impression/Recommendations     78 year old year old female with hx of HTN, Hyperlipidemia is being admitted after Right hip Arthroplasty on 04/15/2019  # S/P Right hip Arthroplasty on 04/15/2019 :  Management primarily per Ortho team.  - Wound cares, Dressings, Surgical pain management, DVT Prophylaxis  per primary team.   - Monitor anemia, hemodynamics, Input/Output  - Encourage Incentive spirometry  - Laxatives for " constipation prophylaxis     # HTN: PTA on Lisinopril  -  Hold tonight  - Hydralazine PRN for SBP > 180 mm Hg    # Renal: Creatinine 1.08 with expected GFR less than 60  Unknown baseline. Creatinine 1.12 on 01/16/2019. Most likely has CKD  - Avoid Nephrotoxins  - Renal dose medications  - Avoid hypotension, dehydration    Thank you for your consult.             Dandre Hernandez  Internal Medicine/Hospitalist  [facility]  [phone]

## 2019-04-15 NOTE — ANESTHESIA POSTPROCEDURE EVALUATION
Anesthesia POST Procedure Evaluation    Patient: Rosemary Mayberry   MRN:     [id number] Gender:   female   Age:    78 year old :      1940        Preoperative Diagnosis: Primary Osteoarthritis Of Right Hip   Procedure(s):  Right Total Hip Arthroplasty   Postop Comments: No value filed.       Anesthesia Type:  General    Reportable Event: NO     PAIN: Uncomplicated   Sign Out status: Comfortable, Well controlled pain     PONV: No PONV   Sign Out status:  No Nausea or Vomiting     Neuro/Psych: Uneventful perioperative course   Sign Out Status: Preoperative baseline; Age appropriate mentation     Airway/Resp.: Uneventful perioperative course   Sign Out Status: Airway Device present     CV: Uneventful perioperative course   Sign Out status: Appropriate BP and perfusion indices; Appropriate HR/Rhythm     Disposition:   Sign Out in:  PACU  Disposition:  Floor  Recovery Course: Uneventful  Follow-Up: Not required                    Last PACU Vitals:  Vitals Value Taken Time   /64 4/15/2019 11:30 AM   Temp 36.5  C (97.7  F) 4/15/2019 11:00 AM   Pulse 72 4/15/2019 11:30 AM   Resp 15 4/15/2019 11:36 AM   SpO2 95 % 4/15/2019 11:39 AM   Temp src     NIBP     Pulse     SpO2     Resp     Temp     Ht Rate     Temp 2     Vitals shown include unvalidated device data.      Electronically Signed By: Chacha Marshall MD, April 15, 2019, 11:40 AM

## 2019-04-15 NOTE — BRIEF OP NOTE
Orthopaedic Surgery Brief Op-Note      Patient: Rosemary Mayberry  : 1940  Date of Service: 4/15/2019 10:16 AM    Pre-operative Diagnosis: Primary Osteoarthritis Of Right Hip  Post-operative Diagnosis: same    Procedure(s) Performed: Procedure(s):  Right Total Hip Arthroplasty    Staff: Dr. Huang  Assistants:   Moni Jaquez MD    Anesthesia: General  EBL: 300 cc    Implants:   Implant Name Type Inv. Item Serial No.  Lot No. LRB No. Used   S &amp; N Acetabular Liner 32mm ID 48mm OD Total Joint Component/Insert   [employer] [id number] Right 1   IMP SHELL SNR ACET R3 3H 48MM [id number] Total Joint Component/Insert IMP SHELL SNR ACET R3 3H 48MM [id number]  [place]  [id number] Right 1   IMP SCR ACET SNN SPHERICAL HEAD 6.5X30MM [id number] Metallic Hardware/[place] IMP SCR ACET SNN SPHERICAL HEAD 6.5X30MM [id number]  [place]  [id number] Right 1   IMP SCR ACET SNN SPHERICAL HEAD 6.5X20MM [id number] Metallic Hardware/[place] IMP SCR ACET SNN SPHERICAL HEAD 6.5X20MM [id number]  [place]  [id number] Right 1   IMP STEM FEM HIP SNN SYNERGY POROUS SZ 13 [id number] Total Joint Component/Insert IMP STEM FEM HIP SNN SYNERGY POROUS SZ 13 [id number]  [place]  [id number] Right 1   IMP HEAD FEMORAL SNR COBALT 32MM +4 [id number] Total Joint Component/Insert IMP HEAD FEMORAL SNR COBALT 32MM +4 [id number]  [place]  [id number] Right 1     Drains: none  Intra-op Labs/Cxs/Specimens: None  Complications: No apparent complications during procedure  Findings: Please see dictated operative note for details    Disposition: Stable to PACU, then admit to Orthopaedics    POST OPERATIVE PLAN    Assessment/Plan: Rosemary Mayberry is a 78 year old female s/p Procedure(s):  Right Total Hip Arthroplasty on 4/15/2019 with Dr. Huang.    Activity: Up with assist and assistive devices as needed until independent. Posterior hip precautions to operative side x 3 months:  1) No hip flexion beyond 90 degrees  2) No adduction beyond midline  3) No internal  rotation beyond neutral   Weight bearing status: WBAT  Antibiotics: Clindamycin x 24 hours  Diet: Begin with clear fluids and progress diet as tolerated. Bowel regimen. Anti-emetics PRN.    DVT prophylaxis: Mechanical while in hospital with Lovenox x 2 weeks, followed by aspirin x 2 weeks  Elevation: Elevate heels off of bed on pillows   Wound Care: Aquacel x 5-7 days.    Drains: none  Pain management: Orals PRN, IV for breakthrough only  X-rays: AP Pelvis and Lateral operative hip in PACU.   Physical Therapy: Mobilization, ROM, ADL's, Posterior hip precautions.    Occupational Therapy: ADL's  Labs: Trend Hgb on POD #1, 2  Consults: PT, OT. Hospitalist, appreciate assistance in caring for this patient throughout the perioperative period    Future Appointments   Date Time Provider Department Center   4/16/2019  7:30 AM Chacha Vasquez OT [facility] [place]   4/16/2019 11:00 AM Merly North, PT [facility] [place]   4/16/2019  3:15 PM Merly North, PT [facility] [place]   5/1/2019 10:30 AM MG NURSE ONLY [facility]TH[facility] [place]   5/28/2019 10:30 AM Adriano Huang MD [facility] [place]       Disposition: Pending progress with therapies, pain control on orals, and medical stability, anticipate discharge to Home vs TCU on POD #1-2.

## 2019-04-15 NOTE — PLAN OF CARE
Discharge Planner PT   Patient plan for discharge: Home with assist either OP or Home PT pt doesn't know what best option is  Current status: Pt sitting up in bed upon arrival is agreeable to physical therapy. Pt completed supine exercises to improve strength, ROM, and circulation Supine>sit SBA. Sit>supine min assist for R LE and maintaining hip precautions. Sit<>stand SBA with pt standing with L LE then able to bear weight on R once standing. Pt ambulated 150' with 2WW and CGA. Pt educated on posterior hip precautions.  Barriers to return to prior living situation: Pain, decreased functional independence, posterior hip precautions   Recommendations for discharge: Home with assist and either home PT or OP PT  Rationale for recommendations: Pt is mobilizing well and requires continued skilled PT to improve strength and functional independence. Pt will have  at home to help as they are both retired.        Entered by: Aminah Bowden 04/15/2019 3:56 PM

## 2019-04-15 NOTE — OP NOTE
OPERATIVE REPORT    DATE OF SERVICE: 4/15/19    SURGEON: Adriano Huang MD.    ASSISTANT(S):  Abran PRIDE who was the only assistant for the position, prepping and draping, and approach and Deandre Jaquez Md    PREOPERATIVE DIAGNOSIS:  Osteoarthritis    POSTOPERATIVE DIAGNOSIS:  Osteoarthritis    OPERATION PERFORMED: RIGHT total hip arthroplasty    IMPLANTS:    Implant Name Type Inv. Item Serial No.  Lot No. LRB No. Used   S &amp; N Acetabular Liner 32mm ID 48mm OD Total Joint Component/Insert   [employer] [id number] Right 1   IMP SHELL SNR ACET R3 3H 48MM [id number] Total Joint Component/Insert IMP SHELL SNR ACET R3 3H 48MM [id number]  [place]  [id number] Right 1   IMP SCR ACET SNN SPHERICAL HEAD 6.5X30MM [id number] Metallic Hardware/[place] IMP SCR ACET SNN SPHERICAL HEAD 6.5X30MM [id number]  [place]  [id number] Right 1   IMP SCR ACET SNN SPHERICAL HEAD 6.5X20MM [id number] Metallic Hardware/[place] IMP SCR ACET SNN SPHERICAL HEAD 6.5X20MM [id number]  [place]  [id number] Right 1   IMP STEM FEM HIP SNN SYNERGY POROUS SZ 13 [id number] Total Joint Component/Insert IMP STEM FEM HIP SNN SYNERGY POROUS SZ 13 [id number]  [place]  [id number] Right 1   IMP HEAD FEMORAL SNR COBALT 32MM +4 [id number] Total Joint Component/Insert IMP HEAD FEMORAL SNR COBALT 32MM +4 [id number]  [place]  [id number] Right 1         ANESTHETIC: General     OPERATIVE FINDINGS:  End stage arthrosis of the hip    BLOOD LOSS: about 250 ml     COMPLICATIONS:  None apparent    OPERATIVE INDICATIONS:  The patient has a long history of debilitating pain secondary to ostearthritis of the hip.  Despite comprehensive non-operative management these symptoms continued to interfere with activities of daily living.  After discussion of further treatment options including the risks and benefits that patient elected to proceed with a total hip.    DESCRIPTION OF THE PROCEDURE:  The patient was identified in the preoperative holding area.  The consent form including the  risks and benefits were reviewed with the patient.  The operative limb was identified and marked.  The patient was brought back to the operating room and placed supine on the operating table.  An anesthetic was induced by the anesthesia team.   The patient was placed in the lateral decubitus position and prepped and draped in the normal standard fashion for a hip replacement.  A time-out was called.  Antibiotics were given.  We utilized an approximately 15 cm curvilinear incision, centered on the vastus ridge, and performed a standard posterior approach to the hip.  The tensor fascia was split.  A small portion of gluteus maki was split in line with its fibers.  The sciatic nerve was palpated.  The east-west retractor was placed.  The posterior border of gluteus medius was exposed and retracted.  The tendon of piriformis and that of the obturators was released from their attachments.  A trapdoor posterior capsulotomy was performed.  The hip was dislocated.  The lesser trochanter was exposed.  A ruler was used to measure and electrocautery was used to magdalena our neck cut as preoperatively templated.  The head was measured with a caliper and found to be 45 mm.  This measurement was used to choose our first reamer.  The neck cut was re-measured. The femur was elevated.  A Hohmann was placed over the anterior rim of the acetabulum and the femur was subluxed anterior.  A split was made in the inferior capsule.  The transverse acetabular ligament was left intact and used a guide for the anterversion of the acetabular component.  Circumferential retractors were placed.  We began reaming and went up by two until sufficient contact was made with the acetabular rim.  We then went up by one millimeter for a one millimeter press-fit.  We were within one size of our preoperative plan.   A trail was placed.  It had an excellent press fit.  We then placed out final component in 40 degrees of inclination and approximately 20  "degrees of anteversion, parallel to the transverse ligament.  The press fit was excellent.  Screws were placed for additional initial fixation.  A flat liner was then placed. It locked into place.  Attention was turned to the femur.  Retractors were placed to elevated the proximal femur and to protect the tendon of gluteus medius.  Remaining lateral neck was removed and the piriformis fossa was cleared of soft-tissue.  A box osteotome and canal finder were used to prepare for broaching.  A sharp broach was used to lateralize slightly.  We then reamed broached up sequentially to a size 13.  It was rotationally stable and sat up 1-2 millimeters from the neck-cut.  Preoperatively the patient had templated to a standard offset stem.  The standard offset stem appropriately tensioned the abductors.  We trialed the following fmoeral heads: 0 and +4 .  The +4 most appropriately tensioned the abductors and clinically equalized the leg-lengths.  The stability exam was excellent.  The hip was stable and there was no impingement posteriorly with hyper-extension and maximal external rotation.  With full extension, the knee could be fixed to bring the foot nearly to the buttock.  With the hip in ninety degrees of flexion and neutral rotation there was greater than 60 degrees of internal rotation before subluxation.  There appropriate movement with a \"sumi\" test.  Happy with our stability exam, the final implant was placed in approximately twenty degrees of anteversion.  It sat within 1 mm of the broach.  We then trailed with a +4 head.  The stability exam was identical.  We then placed the final head on a clean, dry neck and impacted it into place. The hip was reduced after directly visualizing the entire acetabulum.  The wound was then irrigated.  The posterior capsule and short external rotators were sutured to the greater trochanter with non-absorbable suture through bone tunnels.The fascia was closed with interrupted " Vicryl, the dermis with interrupted Vicryl, and skin with running monocryl, Dermabond and steri-strips.  At the end of the procedure the sponge and needle counts were correct times two.  The patient tolerated the procedure well and returned to the PAR extubated and stable.    POSTOPERATIVE PLAN:  1. Weight bearing as tolerated  2. Standard posterior hip precautions  3. DVT prophylaxis   4. 24 hours of prophylactic antibiotics  5. Follow-up:  Wound clinic in 2 weeks and with Sal in clinic in 6 weeks for x-rays and a rehabilitation check.

## 2019-04-15 NOTE — OR NURSING
"Pt fully awake and oriented.  Tolerates po.  States \"pain is tolerable and not so bad and I would not take anything for it\".  Continues with stable vital signs and oxygen saturations.  Breath sounds clear.  "

## 2019-04-16 ENCOUNTER — APPOINTMENT (OUTPATIENT)
Dept: PHYSICAL THERAPY | Facility: CLINIC | Age: 79
DRG: 470 | End: 2019-04-16
Attending: ORTHOPAEDIC SURGERY
Payer: COMMERCIAL

## 2019-04-16 ENCOUNTER — APPOINTMENT (OUTPATIENT)
Dept: OCCUPATIONAL THERAPY | Facility: CLINIC | Age: 79
DRG: 470 | End: 2019-04-16
Attending: ORTHOPAEDIC SURGERY
Payer: COMMERCIAL

## 2019-04-16 LAB
ANION GAP SERPL CALCULATED.3IONS-SCNC: 7 MMOL/L (ref 3–14)
BACTERIA SPEC CULT: NORMAL
BUN SERPL-MCNC: 28 MG/DL (ref 7–30)
CALCIUM SERPL-MCNC: 7.6 MG/DL (ref 8.5–10.1)
CHLORIDE SERPL-SCNC: 107 MMOL/L (ref 94–109)
CO2 SERPL-SCNC: 23 MMOL/L (ref 20–32)
CREAT SERPL-MCNC: 1.24 MG/DL (ref 0.52–1.04)
GFR SERPL CREATININE-BSD FRML MDRD: 42 ML/MIN/{1.73_M2}
GLUCOSE SERPL-MCNC: 140 MG/DL (ref 70–99)
HGB BLD-MCNC: 8.8 G/DL (ref 11.7–15.7)
Lab: NORMAL
POTASSIUM SERPL-SCNC: 4.6 MMOL/L (ref 3.4–5.3)
SODIUM SERPL-SCNC: 137 MMOL/L (ref 133–144)
SPECIMEN SOURCE: NORMAL

## 2019-04-16 PROCEDURE — 25000125 ZZHC RX 250: Performed by: STUDENT IN AN ORGANIZED HEALTH CARE EDUCATION/TRAINING PROGRAM

## 2019-04-16 PROCEDURE — 99232 SBSQ HOSP IP/OBS MODERATE 35: CPT | Performed by: HOSPITALIST

## 2019-04-16 PROCEDURE — 97110 THERAPEUTIC EXERCISES: CPT | Mod: GP | Performed by: PHYSICAL THERAPIST

## 2019-04-16 PROCEDURE — 85018 HEMOGLOBIN: CPT | Performed by: ORTHOPAEDIC SURGERY

## 2019-04-16 PROCEDURE — 25000128 H RX IP 250 OP 636: Performed by: STUDENT IN AN ORGANIZED HEALTH CARE EDUCATION/TRAINING PROGRAM

## 2019-04-16 PROCEDURE — 97116 GAIT TRAINING THERAPY: CPT | Mod: GP | Performed by: PHYSICAL THERAPIST

## 2019-04-16 PROCEDURE — 25800030 ZZH RX IP 258 OP 636: Performed by: HOSPITALIST

## 2019-04-16 PROCEDURE — 97535 SELF CARE MNGMENT TRAINING: CPT | Mod: GO | Performed by: OCCUPATIONAL THERAPIST

## 2019-04-16 PROCEDURE — 80048 BASIC METABOLIC PNL TOTAL CA: CPT | Performed by: ORTHOPAEDIC SURGERY

## 2019-04-16 PROCEDURE — 97165 OT EVAL LOW COMPLEX 30 MIN: CPT | Mod: GO | Performed by: OCCUPATIONAL THERAPIST

## 2019-04-16 PROCEDURE — 25000132 ZZH RX MED GY IP 250 OP 250 PS 637: Performed by: STUDENT IN AN ORGANIZED HEALTH CARE EDUCATION/TRAINING PROGRAM

## 2019-04-16 PROCEDURE — 97530 THERAPEUTIC ACTIVITIES: CPT | Mod: GO | Performed by: OCCUPATIONAL THERAPIST

## 2019-04-16 PROCEDURE — 36415 COLL VENOUS BLD VENIPUNCTURE: CPT | Performed by: ORTHOPAEDIC SURGERY

## 2019-04-16 PROCEDURE — 12000001 ZZH R&B MED SURG/OB UMMC

## 2019-04-16 RX ORDER — ACETAMINOPHEN 325 MG/1
650 TABLET ORAL EVERY 4 HOURS PRN
Qty: 1 BOTTLE | Refills: 0 | Status: SHIPPED | OUTPATIENT
Start: 2019-04-18

## 2019-04-16 RX ORDER — AMOXICILLIN 250 MG
1 CAPSULE ORAL 2 TIMES DAILY
Qty: 30 TABLET | Refills: 0 | Status: SHIPPED | OUTPATIENT
Start: 2019-04-16

## 2019-04-16 RX ORDER — ASPIRIN 325 MG
325 TABLET ORAL DAILY
Qty: 14 TABLET | Refills: 0 | Status: SHIPPED | OUTPATIENT
Start: 2019-05-30 | End: 2019-04-17

## 2019-04-16 RX ORDER — SODIUM CHLORIDE, SODIUM LACTATE, POTASSIUM CHLORIDE, CALCIUM CHLORIDE 600; 310; 30; 20 MG/100ML; MG/100ML; MG/100ML; MG/100ML
INJECTION, SOLUTION INTRAVENOUS CONTINUOUS
Status: DISCONTINUED | OUTPATIENT
Start: 2019-04-16 | End: 2019-04-17

## 2019-04-16 RX ADMIN — ACETAMINOPHEN 975 MG: 325 TABLET, FILM COATED ORAL at 05:52

## 2019-04-16 RX ADMIN — ACETAMINOPHEN 975 MG: 325 TABLET, FILM COATED ORAL at 22:01

## 2019-04-16 RX ADMIN — ENOXAPARIN SODIUM 40 MG: 40 INJECTION SUBCUTANEOUS at 08:44

## 2019-04-16 RX ADMIN — SODIUM CHLORIDE, POTASSIUM CHLORIDE, SODIUM LACTATE AND CALCIUM CHLORIDE: 600; 310; 30; 20 INJECTION, SOLUTION INTRAVENOUS at 15:40

## 2019-04-16 RX ADMIN — SIMVASTATIN 20 MG: 20 TABLET, FILM COATED ORAL at 22:01

## 2019-04-16 RX ADMIN — SENNOSIDES AND DOCUSATE SODIUM 2 TABLET: 8.6; 5 TABLET ORAL at 08:43

## 2019-04-16 RX ADMIN — ACETAMINOPHEN 975 MG: 325 TABLET, FILM COATED ORAL at 14:44

## 2019-04-16 RX ADMIN — CLINDAMYCIN IN 5 PERCENT DEXTROSE 900 MG: 18 INJECTION, SOLUTION INTRAVENOUS at 01:41

## 2019-04-16 RX ADMIN — SENNOSIDES AND DOCUSATE SODIUM 1 TABLET: 8.6; 5 TABLET ORAL at 19:52

## 2019-04-16 RX ADMIN — MENTHOL 1 PATCH: 205.5 PATCH TOPICAL at 12:15

## 2019-04-16 RX ADMIN — LIDOCAINE 1 PATCH: 560 PATCH PERCUTANEOUS; TOPICAL; TRANSDERMAL at 08:46

## 2019-04-16 ASSESSMENT — ACTIVITIES OF DAILY LIVING (ADL)
ADLS_ACUITY_SCORE: 14
ADLS_ACUITY_SCORE: 12

## 2019-04-16 NOTE — PROGRESS NOTES
[facility], [place]    Hospitalist Progress Note    Date of Service (when I saw the patient): 04/16/2019    Assessment & Plan     78 year old year old female with hx of HTN, Hyperlipidemia is being admitted after Right hip Arthroplasty on 04/15/2019    # S/P Right hip Arthroplasty on 04/15/2019 :  Management primarily per Ortho team.    - Wound cares, Dressings, Surgical pain management, DVT Prophylaxis  per primary team.   - Monitor anemia, hemodynamics, Input/Output  - Encourage Incentive spirometry  - Laxatives for constipation prophylaxis     # HTN: PTA on Lisinopril  - Ct to Hold  - Hydralazine PRN for SBP > 180 mm Hg    # Renal: Creatinine 1.2 Unknown baseline. Creatinine 1.12 on 01/16/2019. Most likely has CKD  - Give one liter of IVF. Encourage to drink fluids. Hold lisinopril  - Avoid Nephrotoxins  - Renal dose medications  - Avoid hypotension, dehydration    # Acute blood loss anemia. Baseline not knows but preop Hb was 12.2. On 04/16 Hb 8.8.   -transfuse for Hb less than 7. Monitor.     DVT Prophylaxis: Defer to primary service  Code Status: Full Code    Disposition: per primary    Gurpreet Cohn MD    Interval History      Dong well    -Data reviewed today: I reviewed all new labs and imaging results over the last 24 hours. I personally reviewed no images or EKG's today.    Physical Exam   Temp: 97.2  F (36.2  C) Temp src: Oral BP: 123/49 Pulse: 97   Resp: (!) 67 SpO2: 94 % O2 Device: None (Room air) Oxygen Delivery: 1 LPM  Vitals:    04/15/19 0554   Weight: 69.1 kg (152 lb 5.4 oz)     Vital Signs with Ranges  Temp:  [96  F (35.6  C)-97.2  F (36.2  C)] 97.2  F (36.2  C)  Pulse:  [68-97] 97  Resp:  [67] 67  BP: ()/(46-56) 123/49  SpO2:  [93 %-95 %] 94 %  I/O last 3 completed shifts:  In: -   Out: 250 [Urine:250]    Constitutional:  Awake, alert, cooperative, no apparent distress  Respiratory: Clear to auscultation bilaterally, no crackles or wheezing  Cardiovascular:  Regular rate and rhythm, normal S1 and S2, and no murmur noted  GI: Normal bowel sounds, soft, non-distended, non-tender  Skin/Integumen: No rashes, no cyanosis, no edema    Medications        lactated ringers 75 mL/hr at 04/15/19 1558       acetaminophen  975 mg Oral Q8H     bisacodyl  10 mg Rectal Daily     enoxaparin  40 mg Subcutaneous Q24H     lidocaine  1-3 patch Transdermal Q24H     lidocaine   Transdermal Q8H     lidocaine   Transdermal Q24h     magnesium hydroxide  15 mL Oral BID     menthol   Transdermal Q8H     polyethylene glycol  17 g Oral Daily     senna-docusate  1 tablet Oral BID    Or     senna-docusate  2 tablet Oral BID     simvastatin  20 mg Oral Daily     sodium chloride (PF)  3 mL Intracatheter Q8H       Data   Recent Labs   Lab 04/16/19  0603 04/15/19  1306 04/15/19  0640   HGB 8.8*  --  12.2   PLT  --  200  --      --   --    POTASSIUM 4.6  --   --    CHLORIDE 107  --   --    CO2 23  --   --    BUN 28  --   --    CR 1.24* 1.08*  --    ANIONGAP 7  --   --    ERVIN 7.6*  --   --    *  --  106*       No results found for this or any previous visit (from the past 24 hour(s)).

## 2019-04-16 NOTE — PLAN OF CARE
Pt AxOx4. Pt up with A1 WBAT with a walker. Able to void spontaneously without difficulty in BSC, PVR was 139. Last BM 4/15/19, passing flatus, denies nausea, and active bowel sounds. Tolerating regular diet. Lung sounds clear in all fields, no SOB, no CP. CMS intact. Managing pain with scheduled tylenol and ice packs. PIV infusing in L. Dressing on L hip CDI. Capno on. Will continue to monitor.

## 2019-04-16 NOTE — PLAN OF CARE
Discharge Planner PT   Patient plan for discharge: Home with assist and OP PT  Current status: Pt sitting in chair upon arrival and is agreeable to physical therapy. Pt ambulated 400' with CGA-SBA with rolling walker. Pt able to complete Sit<>stand with SBA, and Sit>supine with min assist for R Le. Pt completed supine exercises for improved strength and circulation. Discussed home vs OP physical therapy with pt. Pt would like to do OP but needs assistance to find one close to home.  Barriers to return to prior living situation: Pain, decreased functional independence  Recommendations for discharge: Home with assist and OP PT  Rationale for recommendations: Pt is mobilizing well but requires continued skilled PT to build strength and increase functional independence.        Entered by: Aminah Bowden 04/16/2019 11:59 AM

## 2019-04-16 NOTE — PROGRESS NOTES
SPIRITUAL HEALTH SERVICES  SPIRITUAL ASSESSMENT Progress Note  [facility] ([facility]) 8A     REFERRAL SOURCE: Follow up from pre-surgery prayer    Met with Rosemary and Jonathon. They shared some stories of their younger years. Looking forward to Rosemary being discharged to their home tomorrow. I offered a prayer for continued healing and strength.    PLAN: Nothing further at this time with discharge anticipated tomorrow.    Mimi Cameron  Chaplain Resident  Pager  [phone]

## 2019-04-16 NOTE — PROGRESS NOTES
"Orthopedic Surgery Progress Note    Subjective: NAEON. Pain controlled. Denies f, c, N/V, CP, SOB, or new n/t.     Exam:  /52 (BP Location: Right arm)   Pulse 74   Temp 96  F (35.6  C) (Oral)   Resp 10   Ht 1.499 m (4' 11\")   Wt 69.1 kg (152 lb 5.4 oz)   SpO2 93%   BMI 30.77 kg/m    Gen: Awake, alert, NAD  Resp: breathing equal and non-labored  Extremities:  RLE: Aquacel hip c/d/i. Mild bruising around hip. 5/5 EHL/FHL/TA/Gsc. SILT in s/s/dp/sp/t distributions. Palpable DP and PT pulses. Toes WWP, brisk cap refill.      Labs:  Recent Labs   Lab 04/16/19  0603 04/15/19  1306 04/15/19  0640   HGB 8.8*  --  12.2   PLT  --  200  --      Recent Labs   Lab 04/16/19  0603 04/15/19  1306 04/15/19  0640     --   --    POTASSIUM 4.6  --   --    CHLORIDE 107  --   --    CO2 23  --   --    BUN 28  --   --    CR 1.24* 1.08*  --    *  --  106*     No lab results found in last 7 days.    Imaging: Satisfactory position of VINNY components. No fxs.    Assessment:   Rosemary Mayberry is a 78 year old female s/p Procedure(s):  Right Total Hip Arthroplasty on 4/15/2019 with Dr. Huang.     Activity: Up with assist and assistive devices as needed until independent. Posterior hip precautions to operative side x 3 months:  1) No hip flexion beyond 90 degrees  2) No adduction beyond midline  3) No internal rotation beyond neutral   Weight bearing status: WBAT  Antibiotics: Clindamycin x 24 hours  Diet: Begin with clear fluids and progress diet as tolerated. Bowel regimen. Anti-emetics PRN.    DVT prophylaxis: Mechanical while in hospital with Lovenox x 2 weeks, followed by aspirin x 2 weeks  Elevation: Elevate heels off of bed on pillows   Wound Care: Aquacel x 5-7 days.    Drains: none  Pain management: Orals PRN, IV for breakthrough only  X-rays: AP Pelvis and Lateral operative hip in PACU.   Physical Therapy: Mobilization, ROM, ADL's, Posterior hip precautions.    Occupational Therapy: ADL's  Labs: Trend Hgb on " POD #1, 2  Consults: PT, OT. Hospitalist, appreciate assistance in caring for this patient throughout the perioperative period  Disposition: Home today or tomorrow    Future Appointments   Date Time Provider Department Center   4/16/2019  7:30 AM Chacha Vasquez, KATY UROT [place]   4/16/2019 11:00 AM Nadia Ortez Pt, PT [facility] [place]   4/16/2019  3:15 PM Merly North, PT [facility] [place]   5/1/2019 10:30 AM MG NURSE ONLY ORTHO [facility] [place]   5/28/2019 10:30 AM Adriano Huang MD [facility]        [facility]  Orthopaedic PGY4  [phone] (pager)

## 2019-04-16 NOTE — PLAN OF CARE
Pt with 1 assist OOB to chair. Stating minimal pain- taking scheduled Tylenol. Pt c/o increasing pain after therapy, ice applied. Pt offered Robaxin and/ or Oxy - pt refusing. Stating the pain is a dull ache only. Lower extremities repositioned for comfort and Icy Hot Patch applied. Adequate intake and output. Vitals stable. CMS intact. Dressing intact. Will continue to monitor.

## 2019-04-16 NOTE — PLAN OF CARE
VS: VSS   O2: Room air   Output: Voiding without difficulty in bedside commode   Last BM: 4/15/19   Activity: Up with assist x1 and walker   Skin: Incision to R hip   Pain: PT pain controlled with scheduled tylenol and ice packs   CMS: Intact   Dressing: CDI   Diet: Regular   LDA: PIV SL   Plan: TBD   Additional Info:

## 2019-04-16 NOTE — PROGRESS NOTES
04/16/19 0741   Quick Adds   Type of Visit Initial Occupational Therapy Evaluation   Living Environment   Lives With spouse   Living Arrangements house   Home Accessibility stairs to enter home   Number of Stairs, Main Entrance 3   Transportation Anticipated family or friend will provide   Living Environment Comment Tub with grab bars; higher toilet   Self-Care   Usual Activity Tolerance good   Current Activity Tolerance moderate   Regular Exercise Yes   Equipment Currently Used at Home none   Activity/Exercise/Self-Care Comment Patient independent in self-cares/mobility without AE   Functional Level   Ambulation 0-->independent   Transferring 0-->independent   Toileting 0-->independent   Bathing 0-->independent   Dressing 0-->independent   Eating 0-->independent   Communication 0-->understands/communicates without difficulty   Swallowing 0-->swallows foods/liquids without difficulty   Cognition 0 - no cognition issues reported   Fall history within last six months no   Prior Functional Level Comment Patient independent in self-cares/mobility without AE   General Information   Onset of Illness/Injury or Date of Surgery - Date 04/15/19   Referring Physician Dr. Huang    Patient/Family Goals Statement return home to baseline   Additional Occupational Profile Info/Pertinent History of Current Problem POD #1 s/p R VINNY   Precautions/Limitations right hip precautions   Weight-Bearing Status - RLE weight-bearing as tolerated   General Observations On RA, alert   Cognitive Status Examination   Cognitive Comment No cognitive concerns   Sensory Examination   Sensory Comments No N/T noted   Pain Assessment   Patient Currently in Pain Yes, see Vital Sign flowsheet   Mobility   Bed Mobility Bed mobility skill: Supine to sit   Bed Mobility Skill: Supine to Sit   Level of [place]: Supine/Sit stand-by assist   Transfer Skill: Bed to Chair/Chair to Bed   Level of [place]: Bed to Chair contact guard   Assistive Device -  "Transfer Skill Bed to Chair Chair to Bed Rehab Eval standard walker   Transfer Skill: Sit to Stand   Level of [place]: Sit/Stand contact guard   Assistive Device for Transfer: Sit/Stand standard walker   Transfer Skill: Toilet Transfer   Level of [place]: Toilet stand-by assist   Assistive Device standard walker;seat riser;grab bars   Upper Body Dressing   Level of [place]: Dress Upper Body independent   Lower Body Dressing   Level of [place]: Dress Lower Body contact guard   Assistive Device long-handled shoe horn;reacher;sock-aid   Toileting   Level of [place]: Toilet stand-by assist   Grooming   Level of [place]: Grooming independent   Eating/Self Feeding   Level of [place]: Eating independent   Activities of Daily Living Analysis   Impairments Contributing to Impaired Activities of Daily Living pain;post surgical precautions;ROM decreased   General Therapy Interventions   Planned Therapy Interventions ADL retraining   Clinical Impression   Criteria for Skilled Therapeutic Interventions Met yes, treatment indicated   OT Diagnosis impaired self-cares/mobility   Influenced by the following impairments pain; decreased ROM   Assessment of Occupational Performance 1-3 Performance Deficits   Identified Performance Deficits dressing, bathing, toileting   Clinical Decision Making (Complexity) Low complexity   Therapy Frequency daily   Predicted Duration of Therapy Intervention (days/wks)  2-3 days   Anticipated Discharge Disposition Home with Assist   Risks and Benefits of Treatment have been explained. Yes   Patient, Family & other staff in agreement with plan of care Yes   [facility] TM \"6 Clicks\"   2016, Trustees of [facility], under license to [employer].  All rights reserved.   6 Clicks Short Forms Daily Activity Inpatient Short Form   [facility] AM-PAC  \"6 Clicks\" Daily Activity Inpatient Short Form   1. Putting on and taking off regular lower body " clothing? 3 - A Little   2. Bathing (including washing, rinsing, drying)? 3 - A Little   3. Toileting, which includes using toilet, bedpan or urinal? 3 - A Little   4. Putting on and taking off regular upper body clothing? 4 - None   5. Taking care of personal grooming such as brushing teeth? 4 - None   6. Eating meals? 4 - None   Daily Activity Raw Score (Score out of 24.Lower scores equate to lower levels of function) 21   Total Evaluation Time   Total Evaluation Time (Minutes) 8

## 2019-04-16 NOTE — PLAN OF CARE
Discharge Planner OT   Patient plan for discharge: Home with assist, prefers discharge POD #2  Current status: Patient alert and oriented, very pleasant.  SBA/CGA for supine to sit, ambulation to/from bathroom, toilet task, g/h tasks standing at sink 5 minutes.  Educated in AE for LB dressing.  Barriers to return to prior living situation: None anticipated  Recommendations for discharge: Home with assist  Rationale for recommendations: Anticipate to meet OT goals POD #2;  continue daily OT for maximum independence in ADLs/mobility       Entered by: Chacha Vasquez 04/16/2019 8:32 AM

## 2019-04-17 ENCOUNTER — APPOINTMENT (OUTPATIENT)
Dept: PHYSICAL THERAPY | Facility: CLINIC | Age: 79
DRG: 470 | End: 2019-04-17
Attending: ORTHOPAEDIC SURGERY
Payer: COMMERCIAL

## 2019-04-17 LAB
ANION GAP SERPL CALCULATED.3IONS-SCNC: 6 MMOL/L (ref 3–14)
BUN SERPL-MCNC: 16 MG/DL (ref 7–30)
CALCIUM SERPL-MCNC: 8.3 MG/DL (ref 8.5–10.1)
CHLORIDE SERPL-SCNC: 105 MMOL/L (ref 94–109)
CO2 SERPL-SCNC: 25 MMOL/L (ref 20–32)
CREAT SERPL-MCNC: 1.04 MG/DL (ref 0.52–1.04)
GFR SERPL CREATININE-BSD FRML MDRD: 52 ML/MIN/{1.73_M2}
GLUCOSE SERPL-MCNC: 107 MG/DL (ref 70–99)
HGB BLD-MCNC: 9.1 G/DL (ref 11.7–15.7)
POTASSIUM SERPL-SCNC: 4.4 MMOL/L (ref 3.4–5.3)
SODIUM SERPL-SCNC: 136 MMOL/L (ref 133–144)

## 2019-04-17 PROCEDURE — 25000132 ZZH RX MED GY IP 250 OP 250 PS 637: Performed by: STUDENT IN AN ORGANIZED HEALTH CARE EDUCATION/TRAINING PROGRAM

## 2019-04-17 PROCEDURE — 36415 COLL VENOUS BLD VENIPUNCTURE: CPT | Performed by: HOSPITALIST

## 2019-04-17 PROCEDURE — 97110 THERAPEUTIC EXERCISES: CPT | Mod: GP

## 2019-04-17 PROCEDURE — 99207 ZZC CDG-MDM COMPONENT: MEETS LOW - DOWN CODED: CPT | Performed by: HOSPITALIST

## 2019-04-17 PROCEDURE — 80048 BASIC METABOLIC PNL TOTAL CA: CPT | Performed by: HOSPITALIST

## 2019-04-17 PROCEDURE — 99232 SBSQ HOSP IP/OBS MODERATE 35: CPT | Performed by: HOSPITALIST

## 2019-04-17 PROCEDURE — 25000131 ZZH RX MED GY IP 250 OP 636 PS 637: Performed by: STUDENT IN AN ORGANIZED HEALTH CARE EDUCATION/TRAINING PROGRAM

## 2019-04-17 PROCEDURE — 25000128 H RX IP 250 OP 636: Performed by: STUDENT IN AN ORGANIZED HEALTH CARE EDUCATION/TRAINING PROGRAM

## 2019-04-17 PROCEDURE — 97116 GAIT TRAINING THERAPY: CPT | Mod: GP

## 2019-04-17 PROCEDURE — 36415 COLL VENOUS BLD VENIPUNCTURE: CPT | Performed by: ORTHOPAEDIC SURGERY

## 2019-04-17 PROCEDURE — 12000001 ZZH R&B MED SURG/OB UMMC

## 2019-04-17 PROCEDURE — 85018 HEMOGLOBIN: CPT | Performed by: ORTHOPAEDIC SURGERY

## 2019-04-17 RX ORDER — AMOXICILLIN 250 MG
1 CAPSULE ORAL 2 TIMES DAILY PRN
Status: DISCONTINUED | OUTPATIENT
Start: 2019-04-17 | End: 2019-04-18 | Stop reason: HOSPADM

## 2019-04-17 RX ORDER — ASPIRIN 325 MG
325 TABLET ORAL DAILY
Qty: 14 TABLET | Refills: 0 | Status: SHIPPED | OUTPATIENT
Start: 2019-04-30 | End: 2019-05-14

## 2019-04-17 RX ORDER — OXYCODONE HYDROCHLORIDE 5 MG/1
5-10 TABLET ORAL EVERY 4 HOURS PRN
Qty: 10 TABLET | Refills: 0 | Status: SHIPPED | OUTPATIENT
Start: 2019-04-17

## 2019-04-17 RX ORDER — OXYCODONE HYDROCHLORIDE 5 MG/1
5-10 TABLET ORAL EVERY 4 HOURS PRN
Qty: 40 TABLET | Refills: 0 | Status: SHIPPED | OUTPATIENT
Start: 2019-04-17 | End: 2019-04-17

## 2019-04-17 RX ORDER — AMOXICILLIN 250 MG
2 CAPSULE ORAL 2 TIMES DAILY PRN
Status: DISCONTINUED | OUTPATIENT
Start: 2019-04-17 | End: 2019-04-18 | Stop reason: HOSPADM

## 2019-04-17 RX ADMIN — ACETAMINOPHEN 975 MG: 325 TABLET, FILM COATED ORAL at 14:11

## 2019-04-17 RX ADMIN — ACETAMINOPHEN 975 MG: 325 TABLET, FILM COATED ORAL at 22:09

## 2019-04-17 RX ADMIN — MENTHOL 1 PATCH: 205.5 PATCH TOPICAL at 03:11

## 2019-04-17 RX ADMIN — ONDANSETRON 4 MG: 4 TABLET, ORALLY DISINTEGRATING ORAL at 08:57

## 2019-04-17 RX ADMIN — SIMVASTATIN 20 MG: 20 TABLET, FILM COATED ORAL at 22:09

## 2019-04-17 RX ADMIN — ENOXAPARIN SODIUM 40 MG: 40 INJECTION SUBCUTANEOUS at 08:52

## 2019-04-17 ASSESSMENT — ACTIVITIES OF DAILY LIVING (ADL)
ADLS_ACUITY_SCORE: 14

## 2019-04-17 NOTE — PLAN OF CARE
OT:  Patient not feeling well this morning per PT; PT in with patient at later time.  Will need to reschedule for 4/18.

## 2019-04-17 NOTE — PLAN OF CARE
"  VS: /66   Pulse 88   Temp 99.9  F (37.7  C) (Oral)   Resp 16   Ht 1.499 m (4' 11\")   Wt 69.1 kg (152 lb 5.4 oz)   SpO2 95%   BMI 30.77 kg/m       O2: Room air   Output: Up to bathroom   Last BM: Loose stool this a.m.   Activity: 1 assist   Up for meals? No   Skin: Warm, dry   Pain: Denies pain   CMS: Denies numbness and tingling   Dressing: CDI   Diet: Regular   LDA: PIV flushing well in left hand   Equipment: N/a   Plan: Discharge to home tomorrow?   Additional Info: Pt had slightly elevated temperature (99.9) this a.m.; was up all night with diarrhea but last stool this a.m.; nauseated much of the morning relieved with PRN zofran       "

## 2019-04-17 NOTE — DISCHARGE SUMMARY
ORTHOPAEDIC DISCHARGE SUMMARY     Date of Admission: 4/15/2019  Date of Discharge: 4/18/2019  2:40 PM  Disposition: Home  Staff Physician: Adriano Huang MD  Primary Care Provider: [facility]    DISCHARGE DIAGNOSIS:  Primary Osteoarthritis Of Right Hip    PROCEDURES: Procedure(s):  Right Total Hip Arthroplasty on 4/15/2019    BRIEF HISTORY:  The patient has a long history of debilitating pain secondary to ostearthritis of the hip.  Despite comprehensive non-operative management these symptoms continued to interfere with activities of daily living.  After discussion of further treatment options including the risks and benefits that patient elected to proceed with a total hip.    HOSPITAL COURSE:    Surgery was uncomplicated. Rosemary Mayberry has done well post-operatively. Medicine was consulted post operatively to aid in management of medical comorbidities. See final recommendations below. The patient received routine nursing cares and is medically stable. Vital signs are stable. The patient is tolerating a regular diet without GI distress/nausea or vomiting. Voiding spontaneously. All PT/OT goals have been met for safe mobility. Pain is now controlled on oral medications which will be available on discharge. Stool softeners have been used while taking pain medications to help prevent constipation. Rosemary Mayberry is deemed medically safe to discharge.     Antibiotics:  Clindamycin given periop and 24 hours postop.  DVT prophylaxis:  Ambulatory with SCD's  PT Progress:  Has met PT/OT goals for safe mobility.   Pain Meds:  Weaned off all IV pain meds by discharge.  Inpatient Events: No significant events or complications.     Discharge orders and instructions as below.    FOLLOWUP:    Future Appointments   Date Time Provider Department Center   4/17/2019  9:30 AM Chacha Vasquez OT UROT [place]   4/17/2019 10:45 AM Nadia Ortez Pt, PT URPT [place]   4/17/2019  3:00 PM  Nadia Ortez Pt, PT URPT [place]   5/1/2019 10:30 AM [facility] NURSE ONLY ORTHO [facility] [place]   5/28/2019 10:30 AM Adriano Huang MD [facility] [place]       Appointments are at the [facility] Surgery Clinic ([address] [place] [address], [address]). Call [phone] if you haven't heard regarding these appointments within 7 days of discharge.    PLANNED DISCHARGE ORDERS:     DVT Prophylaxis: Ambulatory      Current Discharge Medication List      START taking these medications    Details   acetaminophen (TYLENOL) 325 MG tablet Take 2 tablets (650 mg) by mouth every 4 hours as needed for mild pain  Qty: 1 Bottle, Refills: 0    Associated Diagnoses: Status post hip surgery      aspirin (ASA) 325 MG tablet Take 1 tablet (325 mg) by mouth daily for 14 days  Qty: 14 tablet, Refills: 0    Comments: Take aspirin after you have finished the Lovenox.  Associated Diagnoses: Status post hip surgery      enoxaparin (LOVENOX) 40 MG/0.4ML syringe Inject 0.4 mLs (40 mg) Subcutaneous every 24 hours for 12 days  Qty: 4.8 mL, Refills: 0    Associated Diagnoses: Status post hip surgery      oxyCODONE (ROXICODONE) 5 MG tablet Take 1-2 tablets (5-10 mg) by mouth every 4 hours as needed for severe pain  Qty: 40 tablet, Refills: 0    Associated Diagnoses: Status post hip surgery      senna-docusate (SENOKOT-S/PERICOLACE) 8.6-50 MG tablet Take 1 tablet by mouth 2 times daily Hold for loose stools  Qty: 30 tablet, Refills: 0    Associated Diagnoses: Status post hip surgery         CONTINUE these medications which have NOT CHANGED    Details   aspirin 81 MG EC tablet Take 81 mg by mouth daily      lisinopril (PRINIVIL/ZESTRIL) 40 MG tablet Take 40 mg by mouth every evening   Refills: 0      simvastatin (ZOCOR) 20 MG tablet Take 20 mg by mouth daily  Refills: 0               Discharge Procedure Orders   Reason for your hospital stay   Order Comments: You were admitted to the hospital following your total hip arthroplasty.     Adult  [facility] Follow-up and recommended labs and tests   Order Comments: You are to return to clinic in 2 weeks for wound check with the clinic nurse. Approximately 6 weeks after your surgery, you will be scheduled for an appointment with Dr. Huang. At this time, AP pelvis imaging will be obtained.    If you need to schedule your 2 and 6 week postoperative visits or haven't received confirmation regarding these visits, please call one of the following numbers within 7 days of discharge.    For patients that see Dr. Huang at:   -[facility]: [phone]  -[facility]: [phone]  -[facility]: [phone]     Discharge Instructions   Order Comments: TOTAL HIP ARTHROPLASTY POST OPERATIVE DISCHARGE INSTRUCTIONS    FOLLOW UP APPOINTMENT  You are scheduled for a post operative wound check with Dr. Huang s nurse approximately two weeks after surgery. At approximately six weeks after surgery, you will see Dr. Huang in clinic. During this visit, repeat X rays of your operative hip will be performed.      Your follow up appointments will be at the location that you regularly see Dr. Huang:    [facility]  [address] [place] [address]  [phone]    [address] [address] [phone]    [facility] [place]  [address] [place] [address]  [phone]    Physical therapy:   A prescription for physical therapy will be provided at the time of discharge.       ACTIVITY  Weight bearing status:   You may bear weight on your operative extremity as tolerated, using assistive devices (walker, cane) as needed. As you begin to feel more comfortable ambulating, you may gradually transition from a walker to a cane. Eventually, you should wean from all assistive devices. Although we would like you to discontinue  use of assistive devices as soon as possible, do not transition until you have worked with your physical therapist to achieve safe balance and comfort.     Posterior hip precautions:  You must maintain the following posterior hip precautions for the next 12 weeks with no exceptions:  1) no hip flexion >90 degrees (no bending down at the waist)  2) no internal rotation past neutral (no pivoting)  3) no adduction past midline (no crossing your legs)    Exercises:   Perform the following exercises at least three times per day for the first four weeks after surgery to prevent complications, such as blood clots in your legs:  1) Point and flex your feet  2) Move your ankle around in big circles  3) Wiggle your toes   Also, perform thigh muscle tightening exercises for 10 to 15 minutes at least three times per day for the first four weeks after surgery.    Athletic Activities:  Activities such as swimming, bicycling, jogging, running, and stop-and-go sports should be avoided until permitted by your provider.    Driving:  Driving is not permitted until directed by your provider. Under no circumstance are you permitted to drive while using narcotic pain medications.    Return to Work:  You may return to work when directed by your provider.       COMFORT AND PAIN MANAGEMENT  Icing:  An ice pack will be provided to control swelling and discomfort after surgery. Place a thin towel on your skin and apply the ice pack overtop. You may apply ice for 20 minutes as often as two times per hour.    Pain Medications:  You will be discharged with acetaminophen (Tylenol) and a narcotic medication for pain management after surgery. Acetaminophen can help to effectively manage pain when used as prescribed, however, do not exceed the maximum daily dose of 3000 mg. The narcotic pain medication should be reserved for severe, breakthrough pain. Take the narcotic medication as prescribed and use only as often as necessary.        ANTICOAGULATION  Take enoxaparin (Lovenox) as prescribed for a total of two weeks after surgery. After you complete your enoxaparin regimen, take one aspirin 325mg daily for the next two weeks.       WOUND CARE AND SHOWERING  Wound care:  You have a clean Aquacel dressing on your surgical wound. This dressing should stay in place for seven days to allow the incision to heal. After seven days, you may change the dressing. Please use sterile 4x4 gauze dressings with tape over top to secure the dressing. If the Aquacel dressing becomes wet or saturated with wound drainage, it is appropriate to change the dressing before seven days. If drainage persists from the incision site to the extent that it is frequently saturating the dressing, please contact Dr. Huang s clinic.    Showering:  You may shower 48 hours after surgery, provided the Aquacel dressing is in place. You may allow water to run over the dressing, but do not to soak or submerge your wound underwater. The steri-strips (small white tape that is directly on the incision areas) should be left on until they fall off or are removed at your first office visit.    Tub Bathing:  Tub bathing, swimming, or any other activities that cause your incision to be submerged should be avoided until allowed by your provider. Typically, patients are allowed to return to these activities four weeks after surgery.      CONTACTING YOUR PHYSICIAN:  You may experience symptoms that require follow-up before your scheduled two week appointment. Please contact Dr. Huang's office if you experience:  1) Pain that persists or worsens in the first few days after surgery  2) Excessive redness or drainage of cloudy or bloody material from the wounds (Clear red tinted fluid and some mild drainage should be expected) or drainage of any kind five days after surgery  3) A temperature elevation greater than 101.5    4) Pain, swelling or redness in your calf  5) Numbness or weakness in  your leg or foot      Regular business hours (Monday - Friday, 8am - 5pm):  [facility] [place]: [phone]  [facility]: [phone]  [facility]: [phone]    After hours and weekends:  [facility] on call Orthopedic resident: [phone]     Diet   Order Comments: You may return to your regular, pre-surgery diet unless instructed otherwise by your provider.     Order Specific Question Answer Comments   Is discharge order? Yes          Moni Jaquez, PGY4

## 2019-04-17 NOTE — PLAN OF CARE
Pt. A&Ox4. VSS. Afebrile. Lung sounds CTA. Maintaining sats on RA. Bowel sounds active, LBM 4/17. PP+ DP+. CMS and neuro's are intact. Denies numbness and tingling in all extremities. Denies nausea, shortness of breath, and chest pain. R  hip pain managed w/ scheduled Tylenol, states she is comfortable. Voids spontaneously without difficulty in the BR. Tolerating regular diet. R hip incisional dressing is CDI, w/ pencil eraser sized shadow drainage at distal end of dressing. Pt up with ax1 and walker. PIV is patent and SL. PCD's on BLE's. Bilateral heels are elevated off the bed. Call light is within reach, pt able to make needs known and is resting comfortably between cares. Will continue to monitor.

## 2019-04-17 NOTE — PLAN OF CARE
Pt canceled morning session due to diarrhea and upset stomach. Will continue per POC this afternoon.

## 2019-04-17 NOTE — PLAN OF CARE
VS: VSS. Denies chest pain/SOB   O2: >90% on RA   Output: Void spontaneously w/o pain or difficulty   Last BM: 4/15/19   Activity: Up with Standby assist, uses walker   Up for meals? no   Skin: Incision   Pain: Comfortably manageable. Taking tylenol q 8 and using ice packs.  Lidocaine patches removed this shift, did not want to reapply   CMS: Intact, denies numbness/tingling    Dressing: CDI   Diet: Regular, had a small dinner   LDA: PIV in L hand infusing. Fluid bolus, creatinine elevated    Equipment: IV pole, walker, abduction pillow, PCDs   Plan: Anticipated discharge home tomorrow    Additional Info:

## 2019-04-17 NOTE — PROGRESS NOTES
[facility], [place]    Hospitalist Progress Note    Date of Service (when I saw the patient): 04/17/2019    Assessment & Plan     78 year old year old female with hx of HTN, Hyperlipidemia is being admitted after Right hip Arthroplasty on 04/15/2019    # S/P Right hip Arthroplasty on 04/15/2019: Management primarily per Ortho team.    - Wound cares, Dressings, Surgical pain management, DVT Prophylaxis  per primary team.   - Monitor anemia, hemodynamics, Input/Output  - Encourage Incentive spirometry     # HTN: PTA on Lisinopril    - BP going up, restart lisinopril if creat is good.   - Hydralazine PRN for SBP > 180 mm Hg    # Renal: Creatinine 1.2 Unknown baseline. Creatinine 1.12 on 01/16/2019. Most likely has CKD    - SP one liter IVF. Encourage to drink fluids.   - Avoid Nephrotoxins  - Renal dose medications  - Avoid hypotension, dehydration    # Acute blood loss anemia. Baseline not knows but preop Hb was 12.2. On 04/17 Hb 9.1    -transfuse for Hb less than 7. Monitor.     # Diarrhea: Got some senna. We will hold that. She got clindamycin too. That can cause diarrhea. If persists or she gets fever or abdominal pain or high wbc, we will get cdiff on stool.       DVT Prophylaxis: Defer to primary service  Code Status: Full Code    Disposition: per primary    Gurpreet Cohn MD    Interval History      Had diarrhea.     -Data reviewed today: I reviewed all new labs and imaging results over the last 24 hours. I personally reviewed no images or EKG's today.    Physical Exam   Temp: (P) 99.9  F (37.7  C) Temp src: (P) Oral BP: 152/66 Pulse: 88 Heart Rate: 81 Resp: 16 SpO2: 95 % O2 Device: None (Room air)    Vitals:    04/15/19 0554   Weight: 69.1 kg (152 lb 5.4 oz)     Vital Signs with Ranges  Temp:  [97.6  F (36.4  C)-99.9  F (37.7  C)] (P) 99.9  F (37.7  C)  Pulse:  [88] 88  Heart Rate:  [77-81] 81  Resp:  [14-16] 16  BP: (120-152)/(54-66) 152/66  SpO2:  [94 %-97 %] 95 %  I/O last 3  completed shifts:  In: 240 [P.O.:240]  Out: -     Constitutional:  Awake, alert, cooperative, no apparent distress  Respiratory: Clear to auscultation bilaterally, no crackles or wheezing  Cardiovascular: Regular rate and rhythm, normal S1 and S2, and no murmur noted  GI: Normal bowel sounds, soft, non-distended, non-tender  Skin/Integumen: No rashes, no cyanosis, no edema    Medications          acetaminophen  975 mg Oral Q8H     enoxaparin  40 mg Subcutaneous Q24H     lidocaine  1-3 patch Transdermal Q24H     lidocaine   Transdermal Q8H     lidocaine   Transdermal Q24h     menthol   Transdermal Q8H     simvastatin  20 mg Oral Daily     sodium chloride (PF)  3 mL Intracatheter Q8H       Data   Recent Labs   Lab 04/17/19  0650 04/16/19  0603 04/15/19  1306 04/15/19  0640   HGB 9.1* 8.8*  --  12.2   PLT  --   --  200  --    NA  --  137  --   --    POTASSIUM  --  4.6  --   --    CHLORIDE  --  107  --   --    CO2  --  23  --   --    BUN  --  28  --   --    CR  --  1.24* 1.08*  --    ANIONGAP  --  7  --   --    ERVIN  --  7.6*  --   --    GLC  --  140*  --  106*       No results found for this or any previous visit (from the past 24 hour(s)).

## 2019-04-17 NOTE — PLAN OF CARE
Discharge Planner PT   Patient plan for discharge: Home with assist and OP PT  Current status: Pt in chair upon arrival and agreeable to afternoon session. Pt ambulated 300' with RW and SBA. Pt climbed 3 stairs with railing on R and SEC with SBA. Pt more out of breath this afternoon and sounds like she may have a cold. Sit>supine min assist for lifting R Le which pt states  will help her with. Pt completed supine exercises for improved strength and ROM and ended session in bed.   Barriers to return to prior living situation: No PT barriers anticipated once pt feels better.  Recommendations for discharge: Home with assist and OP PT  Rationale for recommendations: Pt requires continued skilled PT to improve strength and functional independence but once she is not feeling as sick patient is mobilizing well enough to return home.        Entered by: Aminah Bowden 04/17/2019 1:48 PM

## 2019-04-17 NOTE — PROGRESS NOTES
"Orthopedic Surgery Progress Note    Subjective: NAEON. Pain controlled. Major concern today is diarrhea. Denies f, c, N/V, CP, SOB, or new n/t. Stool softeners changed to BID PRN.    Exam:  /66   Pulse 88   Temp (P) 99.9  F (37.7  C) (Oral)   Resp 16   Ht 1.499 m (4' 11\")   Wt 69.1 kg (152 lb 5.4 oz)   SpO2 95%   BMI 30.77 kg/m    Gen: Awake, alert, NAD  Resp: breathing equal and non-labored  Extremities:  RLE: Aquacel hip c/d/i. Mild bruising around hip. 5/5 EHL/FHL/TA/Gsc. SILT in s/s/dp/sp/t distributions. Palpable DP and PT pulses. Toes WWP, brisk cap refill.      Labs:    Recent Labs   Lab 04/17/19  0650 04/16/19  0603 04/15/19  1306 04/15/19  0640   HGB 9.1* 8.8*  --  12.2   PLT  --   --  200  --      Recent Labs   Lab 04/16/19  0603 04/15/19  1306 04/15/19  0640     --   --    POTASSIUM 4.6  --   --    CHLORIDE 107  --   --    CO2 23  --   --    BUN 28  --   --    CR 1.24* 1.08*  --    *  --  106*     No lab results found in last 7 days.    Imaging: Satisfactory position of VINNY components. No fxs.    Assessment:   Rosemary Mayberry is a 78 year old female s/p Procedure(s):  Right Total Hip Arthroplasty on 4/15/2019 with Dr. Huang.     Activity: Up with assist and assistive devices as needed until independent. Posterior hip precautions to operative side x 3 months:  1) No hip flexion beyond 90 degrees  2) No adduction beyond midline  3) No internal rotation beyond neutral   Weight bearing status: WBAT  Antibiotics: Clindamycin x 24 hours  Diet: Begin with clear fluids and progress diet as tolerated. Bowel regimen. Anti-emetics PRN.    DVT prophylaxis: Mechanical while in hospital with Lovenox x 2 weeks, followed by aspirin x 2 weeks  Elevation: Elevate heels off of bed on pillows   Wound Care: Aquacel x 5-7 days.    Drains: none  Pain management: Orals PRN, IV for breakthrough only  X-rays: AP Pelvis and Lateral operative hip in PACU.   Physical Therapy: Mobilization, ROM, ADL's, " Posterior hip precautions.    Occupational Therapy: ADL's  Labs: Trend Hgb on POD #1, 2  Consults: PT, OT. Hospitalist, appreciate assistance in caring for this patient throughout the perioperative period  Disposition: Home today pending diarrhea    Future Appointments   Date Time Provider Department Center   4/16/2019  7:30 AM Chacha Vasquez, OT [facility] [place]   4/16/2019 11:00 AM Nadia Ortez Pt, PT URPT [place]   4/16/2019  3:15 PM Merly North, PT URPT [place]   5/1/2019 10:30 AM MG NURSE ONLY ORTHO [facility] [place]   5/28/2019 10:30 AM Adriano Huang MD [facility]        [facility]  Orthopaedic PGY4  [phone] (pager)

## 2019-04-18 ENCOUNTER — APPOINTMENT (OUTPATIENT)
Dept: PHYSICAL THERAPY | Facility: CLINIC | Age: 79
DRG: 470 | End: 2019-04-18
Attending: ORTHOPAEDIC SURGERY
Payer: COMMERCIAL

## 2019-04-18 ENCOUNTER — APPOINTMENT (OUTPATIENT)
Dept: OCCUPATIONAL THERAPY | Facility: CLINIC | Age: 79
DRG: 470 | End: 2019-04-18
Attending: ORTHOPAEDIC SURGERY
Payer: COMMERCIAL

## 2019-04-18 VITALS
BODY MASS INDEX: 30.71 KG/M2 | HEART RATE: 88 BPM | HEIGHT: 59 IN | WEIGHT: 152.34 LBS | DIASTOLIC BLOOD PRESSURE: 56 MMHG | OXYGEN SATURATION: 94 % | TEMPERATURE: 97.7 F | RESPIRATION RATE: 16 BRPM | SYSTOLIC BLOOD PRESSURE: 125 MMHG

## 2019-04-18 LAB
CREAT SERPL-MCNC: 1.06 MG/DL (ref 0.52–1.04)
GFR SERPL CREATININE-BSD FRML MDRD: 51 ML/MIN/{1.73_M2}
PLATELET # BLD AUTO: 182 10E9/L (ref 150–450)

## 2019-04-18 PROCEDURE — 25000132 ZZH RX MED GY IP 250 OP 250 PS 637: Performed by: STUDENT IN AN ORGANIZED HEALTH CARE EDUCATION/TRAINING PROGRAM

## 2019-04-18 PROCEDURE — 97535 SELF CARE MNGMENT TRAINING: CPT | Mod: GO | Performed by: OCCUPATIONAL THERAPIST

## 2019-04-18 PROCEDURE — 97530 THERAPEUTIC ACTIVITIES: CPT | Mod: GP

## 2019-04-18 PROCEDURE — 97116 GAIT TRAINING THERAPY: CPT | Mod: GP

## 2019-04-18 PROCEDURE — 99231 SBSQ HOSP IP/OBS SF/LOW 25: CPT | Performed by: HOSPITALIST

## 2019-04-18 PROCEDURE — 25000128 H RX IP 250 OP 636: Performed by: NURSE PRACTITIONER

## 2019-04-18 PROCEDURE — 90662 IIV NO PRSV INCREASED AG IM: CPT | Performed by: NURSE PRACTITIONER

## 2019-04-18 PROCEDURE — 25000128 H RX IP 250 OP 636: Performed by: STUDENT IN AN ORGANIZED HEALTH CARE EDUCATION/TRAINING PROGRAM

## 2019-04-18 PROCEDURE — 85049 AUTOMATED PLATELET COUNT: CPT | Performed by: ORTHOPAEDIC SURGERY

## 2019-04-18 PROCEDURE — 36415 COLL VENOUS BLD VENIPUNCTURE: CPT | Performed by: ORTHOPAEDIC SURGERY

## 2019-04-18 PROCEDURE — 82565 ASSAY OF CREATININE: CPT | Performed by: ORTHOPAEDIC SURGERY

## 2019-04-18 PROCEDURE — 97110 THERAPEUTIC EXERCISES: CPT | Mod: GP

## 2019-04-18 RX ORDER — LISINOPRIL 40 MG/1
20 TABLET ORAL DAILY
Start: 2019-04-19 | End: 2019-04-21

## 2019-04-18 RX ORDER — ASPIRIN 81 MG/1
81 TABLET ORAL DAILY
COMMUNITY
Start: 2019-05-10

## 2019-04-18 RX ORDER — LISINOPRIL 40 MG/1
40 TABLET ORAL EVERY EVENING
Refills: 0 | COMMUNITY
Start: 2019-04-21

## 2019-04-18 RX ADMIN — ENOXAPARIN SODIUM 40 MG: 40 INJECTION SUBCUTANEOUS at 09:14

## 2019-04-18 RX ADMIN — INFLUENZA A VIRUS A/MICHIGAN/45/2015 X-275 (H1N1) ANTIGEN (FORMALDEHYDE INACTIVATED), INFLUENZA A VIRUS A/SINGAPORE/INFIMH-16-0019/2016 IVR-186 (H3N2) ANTIGEN (FORMALDEHYDE INACTIVATED), AND INFLUENZA B VIRUS B/MARYLAND/15/2016 BX-69A (A B/COLORADO/6/2017-LIKE VIRUS) ANTIGEN (FORMALDEHYDE INACTIVATED) 0.5 ML: 60; 60; 60 INJECTION, SUSPENSION INTRAMUSCULAR at 12:06

## 2019-04-18 RX ADMIN — ACETAMINOPHEN 975 MG: 325 TABLET, FILM COATED ORAL at 06:15

## 2019-04-18 ASSESSMENT — ACTIVITIES OF DAILY LIVING (ADL)
ADLS_ACUITY_SCORE: 14

## 2019-04-18 NOTE — PLAN OF CARE
"  VS: /56 (BP Location: Right arm)   Pulse 88   Temp 97.7  F (36.5  C) (Oral)   Resp 16   Ht 1.499 m (4' 11\")   Wt 69.1 kg (152 lb 5.4 oz)   SpO2 94%   BMI 30.77 kg/m       O2: Room air   Output: Voiding spontaneously   Last BM: 4/17   Activity: 1 assist   Up for meals? Yes   Skin: Warm, dry   Pain: denies   CMS: No numbness, no tingling   Dressing: CDI   Diet: regular   LDA: IV removed for discharge   Equipment: N/a   Plan: discharge   Additional Info:        Pt. Discharged today at 1400;  came and picked her up. Pt. Received meds and discharge materials; expressed understanding of future cares, appointments, and medications. IV removed. Pt left with personal belongings.     "

## 2019-04-18 NOTE — PROGRESS NOTES
[facility], [place]    Hospitalist Progress Note    Date of Service (when I saw the patient): 04/18/2019    Assessment & Plan     78 year old year old female with hx of HTN, Hyperlipidemia is being admitted after Right hip Arthroplasty on 04/15/2019    # S/P Right hip Arthroplasty on 04/15/2019: Management primarily per Ortho team.    - Wound cares, Dressings, Surgical pain management, DVT Prophylaxis  per primary team.   - Monitor anemia, hemodynamics, Input/Output  - Encourage Incentive spirometry     # HTN: PTA on 40 mg po daily of Lisinopril    - BP is good. I recommended her to start lisinopril 20 mg po daily from tomorrow for two days, then take full dose daily as before after two days.   - Hydralazine PRN for SBP > 180 mm Hg    # Renal: Creatinine 1.2 Unknown baseline. Creatinine 1.12 on 01/16/2019. Most likely has CKD    - SP one liter IVF. Encourage to drink fluids.   - Avoid Nephrotoxins  - Renal dose medications  - Avoid hypotension, dehydration    # Acute blood loss anemia. Baseline not knows but preop Hb was 12.2. On 04/17 Hb 9.1    -transfuse for Hb less than 7. Monitor.     # Diarrhea: Is better, had one episode in the morning. Likely from senna-s she got. Improving.       DVT Prophylaxis: Defer to primary service  Code Status: Full Code    Disposition: per primary    Gurpreet Cohn MD    Interval History      Diarrhea is better, had only one episode this morning. No abdominal pain or fever. Doing good otherwise.     -Data reviewed today: I reviewed all new labs and imaging results over the last 24 hours. I personally reviewed no images or EKG's today.    Physical Exam   Temp: 97.7  F (36.5  C) Temp src: Oral BP: 125/56   Heart Rate: 76 Resp: 16 SpO2: 94 % O2 Device: None (Room air)    Vitals:    04/15/19 0554   Weight: 69.1 kg (152 lb 5.4 oz)     Vital Signs with Ranges  Temp:  [97.7  F (36.5  C)-100.2  F (37.9  C)] 97.7  F (36.5  C)  Heart Rate:  [76-86] 76  Resp:   [16-17] 16  BP: (110-136)/(44-64) 125/56  SpO2:  [93 %-95 %] 94 %  No intake/output data recorded.    Constitutional:  Awake, alert, cooperative, no apparent distress  Respiratory: Clear to auscultation bilaterally, no crackles or wheezing  Cardiovascular: Regular rate and rhythm, normal S1 and S2, and no murmur noted  GI: Normal bowel sounds, soft, non-distended, non-tender  Skin/Integumen: No rashes, no cyanosis, no edema    Medications        acetaminophen  975 mg Oral Q8H     enoxaparin  40 mg Subcutaneous Q24H     lidocaine  1-3 patch Transdermal Q24H     lidocaine   Transdermal Q8H     lidocaine   Transdermal Q24h     menthol   Transdermal Q8H     simvastatin  20 mg Oral Daily     sodium chloride (PF)  3 mL Intracatheter Q8H       Data   Recent Labs   Lab 04/18/19  0608 04/17/19  1314 04/17/19  0650 04/16/19  0603 04/15/19  1306  04/15/19  0640   HGB  --   --  9.1* 8.8*  --   --  12.2     --   --   --  200  --   --    NA  --  136  --  137  --   --   --    POTASSIUM  --  4.4  --  4.6  --   --   --    CHLORIDE  --  105  --  107  --   --   --    CO2  --  25  --  23  --   --   --    BUN  --  16  --  28  --   --   --    CR 1.06* 1.04  --  1.24* 1.08*   < >  --    ANIONGAP  --  6  --  7  --   --   --    ERVIN  --  8.3*  --  7.6*  --   --   --    GLC  --  107*  --  140*  --   --  106*    < > = values in this interval not displayed.       No results found for this or any previous visit (from the past 24 hour(s)).

## 2019-04-18 NOTE — PLAN OF CARE
Discharge Planner PT   Patient plan for discharge: Home with assist today and OP PT  Current status: Pt up in chair upon arrival and is agreeable to physical therapy. Sit<>stand SBA. Pt ambulated 300' with RW and SBA. Pt does not sound SOB today when ambulating. Pt climbed 3 stairs with one railing and SEC with SBA. Sit<>supine on gym mat SBA as pt is able to lift own R leg today. Pt completed VINNY HEP for improved strength, ROM, and circulation. Pt educated on car transfer, home walking program, and walker fitting.   Barriers to return to prior living situation: No PT Barriers  Recommendations for discharge: Home with assist and OP PT  Rationale for recommendations: Pt has met all PT goals and can continue to receive skilled services in the outpatient setting.       Physical Therapy Discharge Summary    Reason for therapy discharge:    All goals and outcomes met, no further needs identified.    Progress towards therapy goal(s). See goals on Care Plan in [facility] electronic health record for goal details.  Goals met    Therapy recommendation(s):    Continued therapy is recommended.  Rationale/Recommendations:  Pt will continue with outpatient physical therapy for progression of exercises.           Entered by: Aminah Bowden 04/18/2019 10:00 AM

## 2019-04-18 NOTE — PLAN OF CARE
Discharge Planner OT   Patient plan for discharge: Home with assist  Current status: Patient met all OT goals, AE needs met.    Barriers to return to prior living situation: None  Recommendations for discharge: Home with assist  Rationale for recommendations: No further inpatient needs.  Patient did have difficulty with tub transfer; declined tub transfer bench or home OT for further assessment of task.    Occupational Therapy Discharge Summary    Reason for therapy discharge:    All goals and outcomes met, no further needs identified.    Progress towards therapy goal(s). See goals on Care Plan in [facility] electronic health record for goal details.  Goals met    Therapy recommendation(s):    No further therapy is recommended.           Entered by: Chacha Vasquez 04/18/2019 8:59 AM

## 2019-04-18 NOTE — PROGRESS NOTES
"Orthopedic Surgery Progress Note    Subjective: Stayed yesterday d/t diarrhea. Last BM was yesterday; feels better today and ready to go home. Pain controlled. Denies f, c, N/V, CP, SOB, or new n/t.     Exam:  /64 (BP Location: Right arm)   Pulse 88   Temp 98.5  F (36.9  C) (Oral)   Resp 17   Ht 1.499 m (4' 11\")   Wt 69.1 kg (152 lb 5.4 oz)   SpO2 93%   BMI 30.77 kg/m    Gen: Awake, alert, NAD  Resp: breathing equal and non-labored  Extremities:  RLE: Aquacel hip c/d/i. Mild bruising around hip. 5/5 EHL/FHL/TA/Gsc. SILT in s/s/dp/sp/t distributions. Palpable DP and PT pulses. Toes WWP, brisk cap refill.      Labs:    Recent Labs   Lab 04/18/19  0608 04/17/19  0650 04/16/19  0603 04/15/19  1306 04/15/19  0640   HGB  --  9.1* 8.8*  --  12.2     --   --  200  --      Recent Labs   Lab 04/18/19  0608 04/17/19  1314 04/16/19  0603 04/15/19  1306 04/15/19  0640   NA  --  136 137  --   --    POTASSIUM  --  4.4 4.6  --   --    CHLORIDE  --  105 107  --   --    CO2  --  25 23  --   --    BUN  --  16 28  --   --    CR 1.06* 1.04 1.24* 1.08*  --    GLC  --  107* 140*  --  106*     No lab results found in last 7 days.    Imaging: Satisfactory position of VINNY components. No fxs.    Assessment:   Rosemary Mayberry is a 78 year old female s/p Procedure(s):  Right Total Hip Arthroplasty on 4/15/2019 with Dr. Huang.     Activity: Up with assist and assistive devices as needed until independent. Posterior hip precautions to operative side x 3 months:  1) No hip flexion beyond 90 degrees  2) No adduction beyond midline  3) No internal rotation beyond neutral   Weight bearing status: WBAT  Antibiotics: Clindamycin x 24 hours  Diet: Begin with clear fluids and progress diet as tolerated. Bowel regimen. Anti-emetics PRN.    DVT prophylaxis: Mechanical while in hospital with Lovenox x 2 weeks, followed by aspirin x 2 weeks  Elevation: Elevate heels off of bed on pillows   Wound Care: Aquacel x 5-7 days.    Drains: " none  Pain management: Orals PRN, IV for breakthrough only  X-rays: AP Pelvis and Lateral operative hip in PACU.   Physical Therapy: Mobilization, ROM, ADL's, Posterior hip precautions.    Occupational Therapy: ADL's  Labs: Trend Hgb on POD #1, 2  Consults: PT, OT. Hospitalist, appreciate assistance in caring for this patient throughout the perioperative period  Disposition: Home today    Future Appointments   Date Time Provider Department Center   4/16/2019  7:30 AM Chacha Vasquez, KATY UROT [place]   4/16/2019 11:00 AM Nadia Ortez Pt, PT URPT [place]   4/16/2019  3:15 PM Merly North, PT URPT [place]   5/1/2019 10:30 AM [facility] NURSE ONLY ORTHO [facility] [place]   5/28/2019 10:30 AM dAriano Huang MD [facility]  Orthopaedic PGY4  [phone] (pager)

## 2019-04-18 NOTE — PROGRESS NOTES
VS: Stable on RA      O2: Room air   Output: Up to bathroom, voiding okay.   Last BM: 4/17/19   Activity: 1 assist   Up for meals? Yes   Skin: Warm, dry. Dressing to right hip CDI   Pain: Reports minimal pain to right hip, tylenol effective.   CMS: Denies numbness and tingling   Dressing: CDI   Diet: Regular   LDA: PIV LUE   Equipment: Walker   Plan: Possible discharge today   Additional Info: Night uneventful. SBA to ambulate with walker.

## 2019-04-22 ENCOUNTER — TELEPHONE (OUTPATIENT)
Dept: ORTHOPEDICS | Facility: CLINIC | Age: 79
End: 2019-04-22

## 2019-04-22 NOTE — TELEPHONE ENCOUNTER
Spoke with patient states that she has had cold symptoms since she was in hospital. No fever. Encouraged to deep breathe and cough. Patient states that this is improving. States the right foot slightly swollen, her first physical therapy appt was today but she rescheduled due to her cold symptoms. She states she will make her follow up immediately. Encouraged to attend PT, since her appt is not until the later afternoon. She states she was in one position for a period of time. Advised to complete her PT exercises as suggested by PT, rest prior to her appt today, and to attempt to try PT today as it would be beneficial for her. Discussion on mobility according to her guidelines, elevation, repositioning, ice, and pain. Patient states she has barely needed the pain medication provided as her pain has been manageable and decreased at this point. She was inquiring about how long to wear compression socks. Advised she can wear compression socks for 6 weeks and can take off when showering. She expressed understanding and had no further questions. Advised to follow up with primary care provider if her cold symptoms nasal congestion and cough worsen, since they are improved since hospital at this time.  Kirstie Barrientos RN

## 2019-04-22 NOTE — TELEPHONE ENCOUNTER
[facility] Call Center    Phone Message    May a detailed message be left on voicemail: yes    Reason for Call: Patient calling with questions regarding the sock she was given post op.  Please advise.  Thank you.     Action Taken: Message routed to:  Adult Clinics: Orthopedics p [phone]

## 2019-04-26 ENCOUNTER — THERAPY VISIT (OUTPATIENT)
Dept: PHYSICAL THERAPY | Facility: CLINIC | Age: 79
End: 2019-04-26
Payer: COMMERCIAL

## 2019-04-26 ENCOUNTER — MEDICAL CORRESPONDENCE (OUTPATIENT)
Dept: HEALTH INFORMATION MANAGEMENT | Facility: CLINIC | Age: 79
End: 2019-04-26

## 2019-04-26 DIAGNOSIS — Z98.890 STATUS POST HIP SURGERY: ICD-10-CM

## 2019-04-26 PROCEDURE — 97110 THERAPEUTIC EXERCISES: CPT | Mod: GP | Performed by: PHYSICAL THERAPIST

## 2019-04-26 PROCEDURE — 97161 PT EVAL LOW COMPLEX 20 MIN: CPT | Mod: GP | Performed by: PHYSICAL THERAPIST

## 2019-04-26 ASSESSMENT — ACTIVITIES OF DAILY LIVING (ADL)
HOS_ADL_COUNT: 6
PUTTING_ON_SOCKS_AND_SHOES: NO DIFFICULTY AT ALL
HOS_ADL_ITEM_SCORE_TOTAL: 19
HOW_WOULD_YOU_RATE_YOUR_CURRENT_LEVEL_OF_FUNCTION_DURING_YOUR_USUAL_ACTIVITIES_OF_DAILY_LIVING_FROM_0_TO_100_WITH_100_BEING_YOUR_LEVEL_OF_FUNCTION_PRIOR_TO_YOUR_HIP_PROBLEM_AND_0_BEING_THE_INABILITY_TO_PERFORM_ANY_OF_YOUR_USUAL_DAILY_ACTIVITIES?: 50
STANDING_FOR_15_MINUTES: SLIGHT DIFFICULTY
SITTING_FOR_15_MINUTES: NO DIFFICULTY AT ALL
HOS_ADL_HIGHEST_POTENTIAL_SCORE: 24
LIGHT_TO_MODERATE_WORK: SLIGHT DIFFICULTY
WALKING_INITIALLY: SLIGHT DIFFICULTY
TWISTING/PIVOTING_ON_INVOLVED_LEG: NO DIFFICULTY AT ALL
GETTING_INTO_AND_OUT_OF_A_BATHTUB: MODERATE DIFFICULTY
GETTING_INTO_AND_OUT_OF_AN_AVERAGE_CAR: NO DIFFICULTY AT ALL

## 2019-04-26 NOTE — PROGRESS NOTES
[facility] Initial Evaluation  Subjective:  The history is provided by the patient. No  was used.   Rosemary Mayberry is a 78 year old female with a right hip condition.  Condition occurred with:  Degenerative joint disease.  Context: ARTHRITIC   This is a chronic and new condition  S/P VINNY    DOS: 4/15/2019..    Patient reports pain:  Posterior.  Radiates to:  Thigh.  Pain is described as aching and is intermittent and reported as 4/10 (O/10).  Associated symptoms:  Loss of motion/stiffness and loss of strength. Pain is worse in the A.M..  Symptoms are exacerbated by walking and transfers and relieved by activity/movement.  Since onset symptoms are gradually improving.  Special tests:  X-ray.  Previous treatment: NONE.    General health as reported by patient is fair.  Pertinent medical history includes:  High blood pressure and overweight.  Medical allergies: no.  Other surgeries include:  None reported.  Current medications:  High blood pressure medication.  Current occupation is RETIRED.        Barriers include:  None as reported by the patient.    Red flags:  None as reported by the patient.                        Objective:  System    Physical Exam    General     ROS    Assessment/Plan:    {REHAB NOTES:[id number]}

## 2019-04-26 NOTE — LETTER
DEPARTMENT OF HEALTH AND HUMAN SERVICES  CENTERS FOR MEDICARE & MEDICAID SERVICES    PLAN/UPDATED PLAN OF PROGRESS FOR OUTPATIENT REHABILITATION    PATIENTS NAME:  Rosemary Mayberry   : 1940  PROVIDER NUMBER:    [id number]  HICN:[id number]   PROVIDER NAME: [facility]TH[facility]IC [facility]  MEDICAL RECORD NUMBER: [id number]   START OF CARE DATE:  SOC Date: 19   TYPE:  PT  PRIMARY/TREATMENT DIAGNOSIS: (Pertinent Medical Diagnosis)  Status post hip surgery  VISITS FROM START OF CARE:    1  [facility] Initial Evaluation  Subjective:  The history is provided by the patient. No  was used.   Rosemary Mayberry is a 78 year old female with a right hip condition.  Condition occurred with:  Degenerative joint disease.  Context: ARTHRITIC   This is a chronic and new condition  S/P VINNY    DOS: 4/15/2019.     AMBULATING VIA WALKER. .    Patient reports pain:  Posterior.  Radiates to:  Thigh.  Pain is described as aching and is intermittent and reported as 4/10 (O/10).  Associated symptoms:  Loss of motion/stiffness and loss of strength. Pain is worse in the A.M..  Symptoms are exacerbated by walking and transfers and relieved by activity/movement.  Since onset symptoms are gradually improving.  Special tests:  X-ray.  Previous treatment: NONE.    General health as reported by patient is fair.  Pertinent medical history includes:  High blood pressure and overweight.  Medical allergies: no.  Other surgeries include:  None reported.  Current medications:  High blood pressure medication.  Current occupation is RETIRED.      Barriers include:  None as reported by the patient.  Red flags:  None as reported by the patient.  Objective:  System  Hip Evaluation  Hip PROM:    Flexion: Left: WNL   Right: 66  Abduction: Left: WNL    Right: 12  Internal Rotation: Left: 18    Right: 7  External Rotation: Left: 50    Right: 10  Hip Strength:    Flexion:   Left: 5-/5   Pain:   Right: 4/5   Pain:  Abduction:  Left: 4/5     Pain:Right: /5   Pain:weak/painful  Adduction:  Left: 5/5    Pain:Right: 5/5   Pain:  Knee Flexion:  Left: 5/5   Pain:Right: 5-/5   Pain:  Knee Extension:  Left: 5/5   Pain:Right: 5-/5    Pain:  Assessment/Plan:    Patient is a 78 year old female with right side hip complaints.    Patient has the following significant findings with corresponding treatment plan.                Diagnosis 1:  S/P HIP SURGERY, VINNY.   Pain -  hot/cold therapy, electric stimulation, manual therapy, splint/taping/bracing/orthotics, self management, education, directional preference exercise and home program  Decreased ROM/flexibility - manual therapy, therapeutic exercise, therapeutic activity and home program  Decreased strength - therapeutic exercise, therapeutic activities and home program  Impaired gait - gait training, assistive devices and home program  Decreased function - therapeutic activities and home program    Therapy Evaluation Codes:   1) History comprised of:   Personal factors that impact the plan of care:      Past/current experiences.    Comorbidity factors that impact the plan of care are:      Overweight.     Medications impacting care: None.  2) Examination of Body Systems comprised of:   Body structures and functions that impact the plan of care:      Hip.   Activity limitations that impact the plan of care are:      Bathing, Driving, Dressing, Lifting, Sitting, Squatting/kneeling, Stairs, Standing and Walking.  3) Clinical presentation characteristics are:   Stable/Uncomplicated.  4) Decision-Making    Low complexity using standardized patient assessment instrument and/or measureable assessment of functional outcome.  Cumulative Therapy Evaluation is: Low complexity.  Previous and current functional limitations:  (See Goal Flow Sheet for this information)    Short term and Long term goals: (See Goal Flow Sheet for this information)   Communication ability:  Patient  "appears to be able to clearly communicate and understand verbal and written communication and follow directions correctly.  Treatment Explanation - The following has been discussed with the patient:   RX ordered/plan of care  Anticipated outcomes  Possible risks and side effects  This patient would benefit from PT intervention to resume normal activities.   Rehab potential is excellent.    Frequency:  1 X week, once daily  Duration:  for 12 weeks  Discharge Plan:  Achieve all LTG.  Independent in home treatment program.  Reach maximal therapeutic benefit.    Please refer to the daily flowsheet for treatment today, total treatment time and time spent performing 1:1 timed codes.     Caregiver Signature/Credentials _______________________________________________ Date ________       Reed Langley PT/ATC   I have reviewed and certified the need for these services and plan of treatment while under my care.        PHYSICIAN'S SIGNATURE:   _________________________________________  Date___________     Adriano Johnson MD    Certification period:  Beginning of Cert date period: 04/26/19 to  End of Cert period date: 07/24/19   Functional Level Progress Report: Please see attached \"Goal Flow sheet for Functional level.\"  ____X____ Continue Services or       ________ DC Services              Service dates: From  SOC Date: 04/26/19 date to present                         "

## 2019-04-28 NOTE — PROGRESS NOTES
[facility] Initial Evaluation  Subjective:  The history is provided by the patient. No  was used.   Rosemary Mayberry is a 78 year old female with a right hip condition.  Condition occurred with:  Degenerative joint disease.  Context: ARTHRITIC   This is a chronic and new condition  S/P VINNY    DOS: 4/15/2019.       AMBULATING VIA WALKER. .    Patient reports pain:  Posterior.  Radiates to:  Thigh.  Pain is described as aching and is intermittent and reported as 4/10 (O/10).  Associated symptoms:  Loss of motion/stiffness and loss of strength. Pain is worse in the A.M..  Symptoms are exacerbated by walking and transfers and relieved by activity/movement.  Since onset symptoms are gradually improving.  Special tests:  X-ray.  Previous treatment: NONE.    General health as reported by patient is fair.  Pertinent medical history includes:  High blood pressure and overweight.  Medical allergies: no.  Other surgeries include:  None reported.  Current medications:  High blood pressure medication.  Current occupation is RETIRED.        Barriers include:  None as reported by the patient.    Red flags:  None as reported by the patient.                        Objective:  System                                           Hip Evaluation  Hip PROM:    Flexion: Left: WNL   Right: 66    Abduction: Left: WNL    Right: 12    Internal Rotation: Left: 18    Right: 7  External Rotation: Left: 50    Right: 10              Hip Strength:    Flexion:   Left: 5-/5   Pain:  Right: 4/5   Pain:                      Abduction:  Left: 4/5     Pain:Right: /5   Pain:weak/painful  Adduction:  Left: 5/5    Pain:Right: 5/5   Pain:      Knee Flexion:  Left: 5/5   Pain:Right: 5-/5   Pain:  Knee Extension:  Left: 5/5   Pain:Right: 5-/5    Pain:                       General     ROS    Assessment/Plan:    Patient is a 78 year old female with right side hip complaints.    Patient has the following significant findings  with corresponding treatment plan.                Diagnosis 1:  S/P HIP SURGERY, VINNY.   Pain -  hot/cold therapy, electric stimulation, manual therapy, splint/taping/bracing/orthotics, self management, education, directional preference exercise and home program  Decreased ROM/flexibility - manual therapy, therapeutic exercise, therapeutic activity and home program  Decreased strength - therapeutic exercise, therapeutic activities and home program  Impaired gait - gait training, assistive devices and home program  Decreased function - therapeutic activities and home program    Therapy Evaluation Codes:   1) History comprised of:   Personal factors that impact the plan of care:      Past/current experiences.    Comorbidity factors that impact the plan of care are:      Overweight.     Medications impacting care: None.  2) Examination of Body Systems comprised of:   Body structures and functions that impact the plan of care:      Hip.   Activity limitations that impact the plan of care are:      Bathing, Driving, Dressing, Lifting, Sitting, Squatting/kneeling, Stairs, Standing and Walking.  3) Clinical presentation characteristics are:   Stable/Uncomplicated.  4) Decision-Making    Low complexity using standardized patient assessment instrument and/or measureable assessment of functional outcome.  Cumulative Therapy Evaluation is: Low complexity.    Previous and current functional limitations:  (See Goal Flow Sheet for this information)    Short term and Long term goals: (See Goal Flow Sheet for this information)     Communication ability:  Patient appears to be able to clearly communicate and understand verbal and written communication and follow directions correctly.  Treatment Explanation - The following has been discussed with the patient:   RX ordered/plan of care  Anticipated outcomes  Possible risks and side effects  This patient would benefit from PT intervention to resume normal activities.   Rehab potential  is excellent.    Frequency:  1 X week, once daily  Duration:  for 12 weeks  Discharge Plan:  Achieve all LTG.  Independent in home treatment program.  Reach maximal therapeutic benefit.    Please refer to the daily flowsheet for treatment today, total treatment time and time spent performing 1:1 timed codes.

## 2019-05-01 ENCOUNTER — ALLIED HEALTH/NURSE VISIT (OUTPATIENT)
Dept: NURSING | Facility: CLINIC | Age: 79
End: 2019-05-01
Payer: COMMERCIAL

## 2019-05-01 DIAGNOSIS — Z48.89 ENCOUNTER FOR POST SURGICAL WOUND CHECK: Primary | ICD-10-CM

## 2019-05-01 DIAGNOSIS — Z48.02 VISIT FOR SUTURE REMOVAL: ICD-10-CM

## 2019-05-01 PROCEDURE — 99024 POSTOP FOLLOW-UP VISIT: CPT

## 2019-05-01 NOTE — PATIENT INSTRUCTIONS
Continue anticoagulation plan of: enoxaparin until injections are gone, then start Aspirin 325 mg once daily for 14 days.  If you were not started on enoxaparin, then Aspirin 325 mg once daily for 28 days.      If incision is dry, OK to leave open to air (no bandage). If incision is draining, cover with gauze and keep clean and dry. If steri strips (tapes) applied, let fall off on their own. Please do not apply any ointments or lotions to incision. No soaking in tubs or pools for 3 months after surgery.    If any swelling in leg(s), elevate surgical leg above heart (lay flat, elevate leg on blankets or pillows). Continue to wear compression stockings during the day as long you are having leg swelling. May stop wearing or wear intermittently if not having swelling.     *Continue with post-op hip precautions. These are strict for 3 months post surgery, but should be followed for life. As your muscles gain strength, you will notice that you will have more range of motion, but your risk of dislocating remains.    NO bending past 90 degrees at the waist (operative side)   NO crossing your operative leg over or under your non-operative leg   NO turning your operative leg inward     *Please call if a sharp increase in pain, fall, change in movement or sensation, chest pain, calf pain, shortness of breath, fevers greater than 101.4, redness, erythema around the incision sites.    *If needing refills on pain meds, please call clinic at least 3 days before you run out.    *Continue physical therapy as directed.  If needing orders for Outpatient Physical therapy, please call the clinic.    *Continue to use assistive device: cane or crutch until no limp. Discuss with therapist if questions.    *Dr. Huang advises no dental work or cleaning until 6 months after any surgery with implants to hips or knees unless emergent issue arises. This includes total joint surgeries. Once you are 6 months past your surgery, you will need  "prophylactic antibiotics for the rest of your lifetime with any cleaning or dental work.  This is also for any other \"dirty\" procedures that you may have, such as a colonoscopy.    If you have any questions, call the clinic at [phone] and ask for the Orthopaedics dept.     ----------------------------------------------------------------------    Thanks for coming today.  Ortho/Sports Medicine Clinic  [address] [place] [address]    To schedule future appointments in Ortho Clinic, you may call [phone].    To schedule ordered imaging by your provider:   Call Central Imaging [phone]    To schedule an injection ordered by your provider:  Call Central Imaging Injection scheduling line: [phone]  [employer] App available online at:  [email or web address]    Please call if any further questions or concerns ([phone]).  Clinic hours 8 am to 5 pm.    Return to clinic (call) if symptoms worsen or fail to improve.  "

## 2019-05-01 NOTE — PROGRESS NOTES
Rosemary Mayberry comes into clinic today at the request of Dr. Huang for removal of suture(s), post op wound check.    The patient is status post Right Total Hip Arthroplasty, sx: 4/15/19.   There has been no history of infection or drainage.    O: 1 suture tail seen at the distal aspect of the incision. The wound is healing well with no signs of infection.    Pt taking ASA daily now for 14 days, will resume 81 mg after that is finished. Pt not needing any oxycodone and only taking APAP PRN. Seeing PT at [facility], no issues so far.    A: Suture removal.    P: All sutures were easily removed today. Routine wound care discussed. The patient will follow up in 4 weeks, per post op plan. If there are any concerns or signs and symptoms of infection, patient will follow up in clinic. Patient is agreeable with plan.    Stanley Peoples RN

## 2019-05-06 ENCOUNTER — THERAPY VISIT (OUTPATIENT)
Dept: PHYSICAL THERAPY | Facility: CLINIC | Age: 79
End: 2019-05-06
Payer: COMMERCIAL

## 2019-05-06 DIAGNOSIS — Z98.890 STATUS POST HIP SURGERY: Primary | ICD-10-CM

## 2019-05-06 PROCEDURE — 97530 THERAPEUTIC ACTIVITIES: CPT | Mod: GP | Performed by: PHYSICAL THERAPIST

## 2019-05-06 PROCEDURE — 97110 THERAPEUTIC EXERCISES: CPT | Mod: GP | Performed by: PHYSICAL THERAPIST

## 2019-05-06 PROCEDURE — 97112 NEUROMUSCULAR REEDUCATION: CPT | Mod: GP | Performed by: PHYSICAL THERAPIST

## 2019-05-14 ENCOUNTER — THERAPY VISIT (OUTPATIENT)
Dept: PHYSICAL THERAPY | Facility: CLINIC | Age: 79
End: 2019-05-14
Payer: COMMERCIAL

## 2019-05-14 DIAGNOSIS — Z98.890 STATUS POST HIP SURGERY: Primary | ICD-10-CM

## 2019-05-14 PROCEDURE — 97112 NEUROMUSCULAR REEDUCATION: CPT | Mod: GP | Performed by: PHYSICAL THERAPIST

## 2019-05-14 PROCEDURE — 97110 THERAPEUTIC EXERCISES: CPT | Mod: GP | Performed by: PHYSICAL THERAPIST

## 2019-05-14 PROCEDURE — 97530 THERAPEUTIC ACTIVITIES: CPT | Mod: GP | Performed by: PHYSICAL THERAPIST

## 2019-05-21 ENCOUNTER — THERAPY VISIT (OUTPATIENT)
Dept: PHYSICAL THERAPY | Facility: CLINIC | Age: 79
End: 2019-05-21
Payer: COMMERCIAL

## 2019-05-21 DIAGNOSIS — Z98.890 STATUS POST HIP SURGERY: Primary | ICD-10-CM

## 2019-05-21 PROCEDURE — 97112 NEUROMUSCULAR REEDUCATION: CPT | Mod: GP | Performed by: PHYSICAL THERAPIST

## 2019-05-21 PROCEDURE — 97530 THERAPEUTIC ACTIVITIES: CPT | Mod: GP | Performed by: PHYSICAL THERAPIST

## 2019-05-21 PROCEDURE — 97110 THERAPEUTIC EXERCISES: CPT | Mod: GP | Performed by: PHYSICAL THERAPIST

## 2019-05-21 ASSESSMENT — ACTIVITIES OF DAILY LIVING (ADL)
GOING_UP_1_FLIGHT_OF_STAIRS: SLIGHT DIFFICULTY
HOS_ADL_ITEM_SCORE_TOTAL: 48
HOS_ADL_COUNT: 14
HOW_WOULD_YOU_RATE_YOUR_CURRENT_LEVEL_OF_FUNCTION_DURING_YOUR_USUAL_ACTIVITIES_OF_DAILY_LIVING_FROM_0_TO_100_WITH_100_BEING_YOUR_LEVEL_OF_FUNCTION_PRIOR_TO_YOUR_HIP_PROBLEM_AND_0_BEING_THE_INABILITY_TO_PERFORM_ANY_OF_YOUR_USUAL_DAILY_ACTIVITIES?: 50
RECREATIONAL_ACTIVITIES: SLIGHT DIFFICULTY
LIGHT_TO_MODERATE_WORK: SLIGHT DIFFICULTY
WALKING_APPROXIMATELY_10_MINUTES: NO DIFFICULTY AT ALL
WALKING_INITIALLY: NO DIFFICULTY AT ALL
WALKING_15_MINUTES_OR_GREATER: SLIGHT DIFFICULTY
HEAVY_WORK: SLIGHT DIFFICULTY
SITTING_FOR_15_MINUTES: NO DIFFICULTY AT ALL
STEPPING_UP_AND_DOWN_CURBS: NO DIFFICULTY AT ALL
GETTING_INTO_AND_OUT_OF_A_BATHTUB: NO DIFFICULTY AT ALL
ROLLING_OVER_IN_BED: SLIGHT DIFFICULTY
PUTTING_ON_SOCKS_AND_SHOES: NO DIFFICULTY AT ALL
HOS_ADL_SCORE(%): 85.71
STANDING_FOR_15_MINUTES: SLIGHT DIFFICULTY
TWISTING/PIVOTING_ON_INVOLVED_LEG: NO DIFFICULTY AT ALL
GOING_DOWN_1_FLIGHT_OF_STAIRS: SLIGHT DIFFICULTY
HOS_ADL_HIGHEST_POTENTIAL_SCORE: 56
GETTING_INTO_AND_OUT_OF_AN_AVERAGE_CAR: NO DIFFICULTY AT ALL

## 2019-05-21 NOTE — PROGRESS NOTES
Subjective:  HPI                    Objective:  System    Physical Exam    General     ROS    Assessment/Plan:    PROGRESS  REPORT    Progress reporting period is from 4/26/2019 to 5/21/2019.     SUBJECTIVE  Subjective: EASIER TO MOVE AROUND.  DID SOME STEPS AND THAT WENT.    Current Pain level: 0/10   Initial Pain level: 4/10   Changes in function: Yes, see goal flow sheet for change in function   Adverse reactions: None;   ,     The subjective and objective information are from the last SOAP note on this patient.    OBJECTIVE  Objective: RESISTED: R KNEE EXT: 5/5,  FLEX: 4/5.   R HIP FLEX: 4+/5.  SUPINE PROM: FLEX: 90, ER: 36, IR: 11.       ASSESSMENT/PLAN  Updated problem list and treatment plan: Diagnosis 1:  S/P HIP SURGERY VINNY RIGHT.  Decreased ROM/flexibility - manual therapy, therapeutic exercise, therapeutic activity and home program  Decreased strength - therapeutic exercise, therapeutic activities and home program  Decreased function - therapeutic activities and home program  STG/LTGs have been met or progress has been made towards goals:  Yes (See Goal flow sheet completed today.)  Assessment of Progress: The patient's condition is improving.  Self Management Plans:  Patient has been instructed in a home treatment program.  I have re-evaluated this patient and find that the nature, scope, duration and intensity of the therapy is appropriate for the medical condition of the patient.  Rosemary continues to require the following intervention to meet STG and LTG's: PT  The patient is returning to your office for a recheck appointment.    Recommendations:  This patient would benefit from continued therapy.     Frequency:  1 X week, once daily  Duration:  for 6 weeks        Please refer to the daily flowsheet for treatment today, total treatment time and time spent performing 1:1 timed codes.

## 2019-05-24 DIAGNOSIS — Z96.641 STATUS POST RIGHT HIP REPLACEMENT: Primary | ICD-10-CM

## 2019-05-28 ENCOUNTER — ANCILLARY PROCEDURE (OUTPATIENT)
Dept: GENERAL RADIOLOGY | Facility: CLINIC | Age: 79
End: 2019-05-28
Attending: ORTHOPAEDIC SURGERY
Payer: COMMERCIAL

## 2019-05-28 ENCOUNTER — OFFICE VISIT (OUTPATIENT)
Dept: ORTHOPEDICS | Facility: CLINIC | Age: 79
End: 2019-05-28
Payer: COMMERCIAL

## 2019-05-28 DIAGNOSIS — Z47.89 ORTHOPEDIC AFTERCARE: Primary | ICD-10-CM

## 2019-05-28 DIAGNOSIS — Z96.641 STATUS POST RIGHT HIP REPLACEMENT: ICD-10-CM

## 2019-05-28 PROCEDURE — 73502 X-RAY EXAM HIP UNI 2-3 VIEWS: CPT | Mod: RT | Performed by: RADIOLOGY

## 2019-05-28 PROCEDURE — 99024 POSTOP FOLLOW-UP VISIT: CPT | Performed by: ORTHOPAEDIC SURGERY

## 2019-05-28 NOTE — LETTER
5/28/2019         RE: Rosemary Mayberry  [address]  [address] [address]        Dear Colleague,    Thank you for referring your patient, Rosemary Mayberry, to the [facility]. Please see a copy of my visit note below.    Chief Complaint: Surgical Followup of the Right Hip (, 6 week post op RTHA 4/15/19)       HPI: Rosemary Mayberry returns today in follow-up for her right hip. She is doing really well. Using no pain medication, back to her regular activities. Advancing well in physical therapy.     Medications and allergies are documented in the EMR and have been reviewed.    Current Outpatient Medications:      acetaminophen (TYLENOL) 325 MG tablet, Take 2 tablets (650 mg) by mouth every 4 hours as needed for mild pain, Disp: 1 Bottle, Rfl: 0     aspirin 81 MG EC tablet, Take 1 tablet (81 mg) by mouth daily, Disp: , Rfl:      lisinopril (PRINIVIL/ZESTRIL) 40 MG tablet, Take 0.5 tablets (20 mg) by mouth daily for 2 days, Disp: , Rfl:      lisinopril (PRINIVIL/ZESTRIL) 40 MG tablet, Take 1 tablet (40 mg) by mouth every evening, Disp: , Rfl: 0     oxyCODONE (ROXICODONE) 5 MG tablet, Take 1-2 tablets (5-10 mg) by mouth every 4 hours as needed for severe pain, Disp: 10 tablet, Rfl: 0     senna-docusate (SENOKOT-S/PERICOLACE) 8.6-50 MG tablet, Take 1 tablet by mouth 2 times daily Hold for loose stools, Disp: 30 tablet, Rfl: 0     simvastatin (ZOCOR) 20 MG tablet, Take 20 mg by mouth daily, Disp: , Rfl: 0  Allergies: Cephalexin    Physical Exam:  On physical examination the patient appears the stated age, is in no acute distress, affect is appropriate, and breathing is non-labored.  There were no vitals taken for this visit.  There is no height or weight on file to calculate BMI.  Gait: with cane, rapid, a little Trendelenburg  Incision: healed   ROM: fluid and painless  X-rays:    I reviewed the x-rays dated today.  Previous films reviewed.    Findings:  Normal progression for a total hip  arthroplasty without evidence of loosening or subsidence.    Assessment: doing very well, Reviewed posterior hip precautions.   Plan: cont pT and RTC in 6 weeks for a recheck.     No ref. provider found      Again, thank you for allowing me to participate in the care of your patient.        Sincerely,        Adriano Huang MD

## 2019-05-28 NOTE — PROGRESS NOTES
Chief Complaint: Surgical Followup of the Right Hip (, 6 week post op RTHA 4/15/19)       HPI: Rosemary Mayberry returns today in follow-up for her right hip. She is doing really well. Using no pain medication, back to her regular activities. Advancing well in physical therapy.     Medications and allergies are documented in the EMR and have been reviewed.    Current Outpatient Medications:      acetaminophen (TYLENOL) 325 MG tablet, Take 2 tablets (650 mg) by mouth every 4 hours as needed for mild pain, Disp: 1 Bottle, Rfl: 0     aspirin 81 MG EC tablet, Take 1 tablet (81 mg) by mouth daily, Disp: , Rfl:      lisinopril (PRINIVIL/ZESTRIL) 40 MG tablet, Take 0.5 tablets (20 mg) by mouth daily for 2 days, Disp: , Rfl:      lisinopril (PRINIVIL/ZESTRIL) 40 MG tablet, Take 1 tablet (40 mg) by mouth every evening, Disp: , Rfl: 0     oxyCODONE (ROXICODONE) 5 MG tablet, Take 1-2 tablets (5-10 mg) by mouth every 4 hours as needed for severe pain, Disp: 10 tablet, Rfl: 0     senna-docusate (SENOKOT-S/PERICOLACE) 8.6-50 MG tablet, Take 1 tablet by mouth 2 times daily Hold for loose stools, Disp: 30 tablet, Rfl: 0     simvastatin (ZOCOR) 20 MG tablet, Take 20 mg by mouth daily, Disp: , Rfl: 0  Allergies: Cephalexin    Physical Exam:  On physical examination the patient appears the stated age, is in no acute distress, affect is appropriate, and breathing is non-labored.  There were no vitals taken for this visit.  There is no height or weight on file to calculate BMI.  Gait: with cane, rapid, a little Trendelenburg  Incision: healed   ROM: fluid and painless  X-rays:    I reviewed the x-rays dated today.  Previous films reviewed.    Findings:  Normal progression for a total hip arthroplasty without evidence of loosening or subsidence.    Assessment: doing very well, Reviewed posterior hip precautions.   Plan: cont pT and RTC in 6 weeks for a recheck.     No ref. provider found

## 2019-05-28 NOTE — NURSING NOTE
Rosemary Mayberry's chief complaint for this visit includes:  Chief Complaint   Patient presents with     Right Hip - Surgical Followup     , 6 week post op RTHA 4/15/19     PCP: [facility], [facility]    Referring Provider:  No referring provider defined for this encounter.    There were no vitals taken for this visit.  Data Unavailable     Do you need any medication refills at today's visit? Jaye Ragland LPN

## 2019-05-28 NOTE — PATIENT INSTRUCTIONS
Thanks for coming today.  Ortho/Sports Medicine Clinic  [address] [place] [address]    To schedule future appointments in Ortho Clinic, you may call [phone].    To schedule ordered imaging by your provider:   Call Central Imaging [phone]    To schedule an injection ordered by your provider:  Call Central Imaging Injection scheduling line: [phone]  [employer] available online at:  [email or web address]    Please call if any further questions or concerns ([phone]).  Clinic hours 8 am to 5 pm.    Return to clinic (call) if symptoms worsen or fail to improve.

## 2019-06-04 ENCOUNTER — THERAPY VISIT (OUTPATIENT)
Dept: PHYSICAL THERAPY | Facility: CLINIC | Age: 79
End: 2019-06-04
Payer: COMMERCIAL

## 2019-06-04 DIAGNOSIS — Z98.890 STATUS POST HIP SURGERY: Primary | ICD-10-CM

## 2019-06-04 PROCEDURE — 97530 THERAPEUTIC ACTIVITIES: CPT | Mod: GP | Performed by: PHYSICAL THERAPIST

## 2019-06-04 PROCEDURE — 97112 NEUROMUSCULAR REEDUCATION: CPT | Mod: GP | Performed by: PHYSICAL THERAPIST

## 2019-06-04 PROCEDURE — 97110 THERAPEUTIC EXERCISES: CPT | Mod: GP | Performed by: PHYSICAL THERAPIST

## 2019-06-11 ENCOUNTER — THERAPY VISIT (OUTPATIENT)
Dept: PHYSICAL THERAPY | Facility: CLINIC | Age: 79
End: 2019-06-11
Payer: COMMERCIAL

## 2019-06-11 DIAGNOSIS — Z98.890 STATUS POST HIP SURGERY: Primary | ICD-10-CM

## 2019-06-11 PROCEDURE — 97530 THERAPEUTIC ACTIVITIES: CPT | Mod: GP | Performed by: PHYSICAL THERAPIST

## 2019-06-11 PROCEDURE — 97112 NEUROMUSCULAR REEDUCATION: CPT | Mod: GP | Performed by: PHYSICAL THERAPIST

## 2019-06-11 PROCEDURE — 97110 THERAPEUTIC EXERCISES: CPT | Mod: GP | Performed by: PHYSICAL THERAPIST

## 2019-06-25 ENCOUNTER — THERAPY VISIT (OUTPATIENT)
Dept: PHYSICAL THERAPY | Facility: CLINIC | Age: 79
End: 2019-06-25
Payer: COMMERCIAL

## 2019-06-25 DIAGNOSIS — Z98.890 STATUS POST HIP SURGERY: Primary | ICD-10-CM

## 2019-06-25 PROCEDURE — 97530 THERAPEUTIC ACTIVITIES: CPT | Mod: GP | Performed by: PHYSICAL THERAPIST

## 2019-06-25 PROCEDURE — 97112 NEUROMUSCULAR REEDUCATION: CPT | Mod: GP | Performed by: PHYSICAL THERAPIST

## 2019-06-25 PROCEDURE — 97110 THERAPEUTIC EXERCISES: CPT | Mod: GP | Performed by: PHYSICAL THERAPIST

## 2019-06-25 NOTE — PROGRESS NOTES
ASubjective:  HPI                    Objective:  System    Physical Exam    General     ROS    Assessment/Plan:    PROGRESS  REPORT    Progress reporting period is from 4/26/2019 to 6/21/2019.     SUBJECTIVE  Subjective: STRONGER, SELDOM HAS PAIN.    Current Pain level: 0/10   Initial Pain level: 4/10   Changes in function: No changes noted in function since last SOAP note    ;   ,     The subjective and objective information are from the last SOAP note on this patient.    OBJECTIVE  Objective: RESISTED: R HIP FLEX: 5-/5,  R KNEE EXT: 5/5,  KNEE FLEX: 5/5.    R HIP ABD: 4-/5.       ASSESSMENT/PLAN  Updated problem list and treatment plan: Diagnosis 1:  S/P R VINNY  Decreased strength - therapeutic exercise, therapeutic activities and home program  Decreased function - therapeutic activities and home program  STG/LTGs have been met or progress has been made towards goals:  Yes (See Goal flow sheet completed today.)  Assessment of Progress: The patient's condition is improving.  Self Management Plans:  Patient has been instructed in a home treatment program.  I have re-evaluated this patient and find that the nature, scope, duration and intensity of the therapy is appropriate for the medical condition of the patient.  Rosemary continues to require the following intervention to meet STG and LTG's: PT  CONTINUE PT.     Recommendations:  This patient would benefit from continued therapy.     Frequency:  1 X week, once daily  Duration:  for 5 weeks        Please refer to the daily flowsheet for treatment today, total treatment time and time spent performing 1:1 timed codes.

## 2019-07-09 ENCOUNTER — OFFICE VISIT (OUTPATIENT)
Dept: ORTHOPEDICS | Facility: CLINIC | Age: 79
End: 2019-07-09
Payer: COMMERCIAL

## 2019-07-09 VITALS
SYSTOLIC BLOOD PRESSURE: 147 MMHG | BODY MASS INDEX: 30.64 KG/M2 | WEIGHT: 152 LBS | HEIGHT: 59 IN | HEART RATE: 70 BPM | DIASTOLIC BLOOD PRESSURE: 75 MMHG

## 2019-07-09 DIAGNOSIS — Z47.89 ORTHOPEDIC AFTERCARE: Primary | ICD-10-CM

## 2019-07-09 PROCEDURE — 99024 POSTOP FOLLOW-UP VISIT: CPT | Performed by: ORTHOPAEDIC SURGERY

## 2019-07-09 ASSESSMENT — MIFFLIN-ST. JEOR: SCORE: 1085.1

## 2019-07-09 ASSESSMENT — PAIN SCALES - GENERAL: PAINLEVEL: NO PAIN (0)

## 2019-07-09 NOTE — LETTER
"    7/9/2019         RE: Rosemary Mayberry  [address]  [place] [address]        Dear Colleague,    Thank you for referring your patient, Rosemary Mayberry, to the [facility]. Please see a copy of my visit note below.    Chief Complaint: Follow Up (Follow up s/p right VINNY on 4-15-19)       HPI: Rosemary Mayberry returns today in follow-up for her right total hip. She reports that she is doing very well. She is working in physical therapy on her gait. SHe is walking 15 minutes at a time and reports no pain.      Medications and allergies are documented in the EMR and have been reviewed.    Current Outpatient Medications:      acetaminophen (TYLENOL) 325 MG tablet, Take 2 tablets (650 mg) by mouth every 4 hours as needed for mild pain, Disp: 1 Bottle, Rfl: 0     aspirin 81 MG EC tablet, Take 1 tablet (81 mg) by mouth daily, Disp: , Rfl:      lisinopril (PRINIVIL/ZESTRIL) 40 MG tablet, Take 1 tablet (40 mg) by mouth every evening, Disp: , Rfl: 0     oxyCODONE (ROXICODONE) 5 MG tablet, Take 1-2 tablets (5-10 mg) by mouth every 4 hours as needed for severe pain, Disp: 10 tablet, Rfl: 0     senna-docusate (SENOKOT-S/PERICOLACE) 8.6-50 MG tablet, Take 1 tablet by mouth 2 times daily Hold for loose stools, Disp: 30 tablet, Rfl: 0     simvastatin (ZOCOR) 20 MG tablet, Take 20 mg by mouth daily, Disp: , Rfl: 0     lisinopril (PRINIVIL/ZESTRIL) 40 MG tablet, Take 0.5 tablets (20 mg) by mouth daily for 2 days, Disp: , Rfl:   Allergies: Cephalexin    Physical Exam:  On physical examination the patient appears the stated age, is in no acute distress, affect is appropriate, and breathing is non-labored.  /75   Pulse 70   Ht 1.499 m (4' 11\")   Wt 68.9 kg (152 lb)   BMI 30.70 kg/m     Body mass index is 30.7 kg/m .  Gait: normal gait with a cane. Trendelenburg without.     Assessment: doing well. Gait is improving as is walking distance and stairs.   Plan: appropriate for one year follow-up. " Sooner is issues.     No ref. provider found      Again, thank you for allowing me to participate in the care of your patient.        Sincerely,        Adriano Huang MD

## 2019-07-09 NOTE — PROGRESS NOTES
"Chief Complaint: Follow Up (Follow up s/p right VINNY on 4-15-19)       HPI: Rosemary Mayberry returns today in follow-up for her right total hip. She reports that she is doing very well. She is working in physical therapy on her gait. SHe is walking 15 minutes at a time and reports no pain.      Medications and allergies are documented in the EMR and have been reviewed.    Current Outpatient Medications:      acetaminophen (TYLENOL) 325 MG tablet, Take 2 tablets (650 mg) by mouth every 4 hours as needed for mild pain, Disp: 1 Bottle, Rfl: 0     aspirin 81 MG EC tablet, Take 1 tablet (81 mg) by mouth daily, Disp: , Rfl:      lisinopril (PRINIVIL/ZESTRIL) 40 MG tablet, Take 1 tablet (40 mg) by mouth every evening, Disp: , Rfl: 0     oxyCODONE (ROXICODONE) 5 MG tablet, Take 1-2 tablets (5-10 mg) by mouth every 4 hours as needed for severe pain, Disp: 10 tablet, Rfl: 0     senna-docusate (SENOKOT-S/PERICOLACE) 8.6-50 MG tablet, Take 1 tablet by mouth 2 times daily Hold for loose stools, Disp: 30 tablet, Rfl: 0     simvastatin (ZOCOR) 20 MG tablet, Take 20 mg by mouth daily, Disp: , Rfl: 0     lisinopril (PRINIVIL/ZESTRIL) 40 MG tablet, Take 0.5 tablets (20 mg) by mouth daily for 2 days, Disp: , Rfl:   Allergies: Cephalexin    Physical Exam:  On physical examination the patient appears the stated age, is in no acute distress, affect is appropriate, and breathing is non-labored.  /75   Pulse 70   Ht 1.499 m (4' 11\")   Wt 68.9 kg (152 lb)   BMI 30.70 kg/m    Body mass index is 30.7 kg/m .  Gait: normal gait with a cane. Trendelenburg without.     Assessment: doing well. Gait is improving as is walking distance and stairs.   Plan: appropriate for one year follow-up. Sooner is issues.     No ref. provider found    "

## 2019-07-09 NOTE — PATIENT INSTRUCTIONS
Thanks for coming today.  [facility]  [address] [place] [address]    To schedule future appointments in Ortho Clinic, you may call [phone].    To schedule ordered imaging by your provider:   Call [facility] [phone]    To schedule an injection ordered by your provider:  Call [facility] Injection scheduling line: [phone]  Power Content available online at:  [email or web address]    Please call if any further questions or concerns ([phone]).  Clinic hours 8 am to 5 pm.    Return to clinic (call) if symptoms worsen or fail to improve.    Preventive Care:    Colorectal Cancer Screening: During our visit today, we discussed that it is recommended you receive colorectal cancer screening. Please call or make an appointment with your primary care provider to discuss this. You may also call the [facility] scheduling line ([phone]) to set up a colonoscopy appointment.

## 2019-07-30 ENCOUNTER — THERAPY VISIT (OUTPATIENT)
Dept: PHYSICAL THERAPY | Facility: CLINIC | Age: 79
End: 2019-07-30
Payer: COMMERCIAL

## 2019-07-30 DIAGNOSIS — Z98.890 STATUS POST HIP SURGERY: Primary | ICD-10-CM

## 2019-07-30 PROCEDURE — 97112 NEUROMUSCULAR REEDUCATION: CPT | Mod: GP | Performed by: PHYSICAL THERAPIST

## 2019-07-30 PROCEDURE — 97110 THERAPEUTIC EXERCISES: CPT | Mod: GP | Performed by: PHYSICAL THERAPIST

## 2019-08-13 ENCOUNTER — THERAPY VISIT (OUTPATIENT)
Dept: PHYSICAL THERAPY | Facility: CLINIC | Age: 79
End: 2019-08-13
Payer: COMMERCIAL

## 2019-08-13 DIAGNOSIS — Z98.890 STATUS POST HIP SURGERY: Primary | ICD-10-CM

## 2019-08-13 PROCEDURE — 97530 THERAPEUTIC ACTIVITIES: CPT | Mod: GP | Performed by: PHYSICAL THERAPIST

## 2019-08-13 PROCEDURE — 97110 THERAPEUTIC EXERCISES: CPT | Mod: GP | Performed by: PHYSICAL THERAPIST

## 2019-08-13 ASSESSMENT — ACTIVITIES OF DAILY LIVING (ADL)
SITTING_FOR_15_MINUTES: NO DIFFICULTY AT ALL
STANDING_FOR_15_MINUTES: NO DIFFICULTY AT ALL
ROLLING_OVER_IN_BED: NO DIFFICULTY AT ALL
STEPPING_UP_AND_DOWN_CURBS: NO DIFFICULTY AT ALL
TWISTING/PIVOTING_ON_INVOLVED_LEG: MODERATE DIFFICULTY
WALKING_15_MINUTES_OR_GREATER: NO DIFFICULTY AT ALL
WALKING_INITIALLY: NO DIFFICULTY AT ALL
GOING_UP_1_FLIGHT_OF_STAIRS: SLIGHT DIFFICULTY
HOW_WOULD_YOU_RATE_YOUR_CURRENT_LEVEL_OF_FUNCTION_DURING_YOUR_USUAL_ACTIVITIES_OF_DAILY_LIVING_FROM_0_TO_100_WITH_100_BEING_YOUR_LEVEL_OF_FUNCTION_PRIOR_TO_YOUR_HIP_PROBLEM_AND_0_BEING_THE_INABILITY_TO_PERFORM_ANY_OF_YOUR_USUAL_DAILY_ACTIVITIES?: 85
GETTING_INTO_AND_OUT_OF_A_BATHTUB: NO DIFFICULTY AT ALL
WALKING_APPROXIMATELY_10_MINUTES: NO DIFFICULTY AT ALL
DEEP_SQUATTING: NO DIFFICULTY AT ALL
HOS_ADL_COUNT: 14
GETTING_INTO_AND_OUT_OF_AN_AVERAGE_CAR: NO DIFFICULTY AT ALL
LIGHT_TO_MODERATE_WORK: NO DIFFICULTY AT ALL
PUTTING_ON_SOCKS_AND_SHOES: SLIGHT DIFFICULTY
HEAVY_WORK: MODERATE DIFFICULTY
HOS_ADL_HIGHEST_POTENTIAL_SCORE: 56
HOS_ADL_SCORE(%): 89.29
HOS_ADL_ITEM_SCORE_TOTAL: 50
GOING_DOWN_1_FLIGHT_OF_STAIRS: SLIGHT DIFFICULTY

## 2019-08-13 NOTE — PROGRESS NOTES
Subjective:  HPI                    Objective:  System    Physical Exam    General     ROS    Assessment/Plan:    DISCHARGE REPORT    Progress reporting period is from 4/26/2019 to 8/13/2019.     SUBJECTIVE  Subjective: INTERMITTENT SHORT DISTANCES WITHOUT THE CANE.  NO C/O WALKING WITH CANE.    Current Pain level: 0/10   Initial Pain level: 4/10   Changes in function: No changes noted in function since last SOAP note   Adverse reactions: None;   ,     The subjective and objective information are from the last SOAP note on this patient.    OBJECTIVE  Objective: AMBULATION WITH CANE IS SMOOTH.    RESISTED:  R KNEE EXT: 5/5.  FLEX: 5-/5.   R HIP FLEX: 5/5.  ABD: 4/5      ASSESSMENT/PLAN  Updated problem list and treatment plan: Diagnosis 1:  S/P HIP SURGERY VINNY R.   Pain -  hot/cold therapy, electric stimulation, manual therapy, splint/taping/bracing/orthotics, self management, education, directional preference exercise and home program  Decreased strength - therapeutic exercise, therapeutic activities and home program  Decreased function - therapeutic activities and home program  STG/LTGs have been met or progress has been made towards goals:  Yes (See Goal flow sheet completed today.)  Assessment of Progress: The patient's condition is improving.  Self Management Plans:  Patient has been instructed in a home treatment program.  I have re-evaluated this patient and find that the nature, scope, duration and intensity of the therapy is appropriate for the medical condition of the patient.  Rosemary continues to require the following intervention to meet STG and LTG's: PT intervention is no longer required to meet STG/LTG.  We will discharge this patient from PT.    Recommendations:  This patient is ready to be discharged from therapy and continue their home treatment program.    Please refer to the daily flowsheet for treatment today, total treatment time and time spent performing 1:1 timed codes.

## 2021-03-01 ENCOUNTER — IMMUNIZATION (OUTPATIENT)
Dept: PEDIATRICS | Facility: CLINIC | Age: 81
End: 2021-03-01
Payer: COMMERCIAL

## 2021-03-01 PROCEDURE — 0001A PR COVID VAC PFIZER DIL RECON 30 MCG/0.3 ML IM: CPT

## 2021-03-01 PROCEDURE — 91300 PR COVID VAC PFIZER DIL RECON 30 MCG/0.3 ML IM: CPT

## 2021-03-22 ENCOUNTER — IMMUNIZATION (OUTPATIENT)
Dept: PEDIATRICS | Facility: CLINIC | Age: 81
End: 2021-03-22
Attending: INTERNAL MEDICINE
Payer: COMMERCIAL

## 2021-03-22 PROCEDURE — 0002A PR COVID VAC PFIZER DIL RECON 30 MCG/0.3 ML IM: CPT

## 2021-03-22 PROCEDURE — 91300 PR COVID VAC PFIZER DIL RECON 30 MCG/0.3 ML IM: CPT

## 2024-01-15 ENCOUNTER — TRANSCRIBE ORDERS (OUTPATIENT)
Dept: OTHER | Age: 84
End: 2024-01-15

## 2024-01-15 DIAGNOSIS — I48.91 ATRIAL FIBRILLATION WITH RAPID VENTRICULAR RESPONSE (H): Primary | ICD-10-CM

## 2024-01-15 DIAGNOSIS — I48.91 ATRIAL FIBRILLATION WITH RVR (H): Primary | ICD-10-CM

## (undated) DEVICE — SU DERMABOND ADVANCED .7ML DNX12

## (undated) DEVICE — DRSG STERI STRIP 1/2X4" R1547

## (undated) DEVICE — DEVICE RETRIEVER HEWSON 71111579

## (undated) DEVICE — GOWN IMPERVIOUS SPECIALTY XLG/XLONG 32474

## (undated) DEVICE — DRSG AQUACEL AG 3.5X9.75" HYDROFIBER 412011

## (undated) DEVICE — DRAPE IOBAN INCISE 36X23" 6651EZ

## (undated) DEVICE — DRILL BIT FLEXIBLE REFLECTION 35MM 71362935

## (undated) DEVICE — GOWN XLG DISP 9545

## (undated) DEVICE — STRAP STIRRUP W/SLIP 30187-030

## (undated) DEVICE — PREP CHLORAPREP 26ML TINTED ORANGE  260815

## (undated) DEVICE — PACK TOTAL HIP W/POUCH RIVERSIDE LATEX FREE

## (undated) DEVICE — BLADE KNIFE SURG 20 371120

## (undated) DEVICE — SOL NACL 0.9% INJ 1000ML BAG 2B1324X

## (undated) DEVICE — ESU GROUND PAD ADULT W/CORD E7507

## (undated) DEVICE — SU VICRYL 2-0 CT-1 27" UND J259H

## (undated) DEVICE — SOL WATER IRRIG 1000ML BOTTLE 2F7114

## (undated) DEVICE — GLOVE PROTEXIS POWDER FREE 7.5 ORTHOPEDIC 2D73ET75

## (undated) DEVICE — SU MONOCRYL 3-0 PS-1 27" Y936H

## (undated) DEVICE — SU ETHIBOND 5 V-37 4X30" MB66G

## (undated) DEVICE — CLIP HORIZON LG ORANGE 004200

## (undated) DEVICE — DRAPE STERI TOWEL LG 1010

## (undated) DEVICE — LINEN MAYO STAND COVER OVERSIZE PACK 5458

## (undated) DEVICE — GLOVE PROTEXIS BLUE W/NEU-THERA 8.0  2D73EB80

## (undated) DEVICE — STRAP KNEE/BODY 31143004

## (undated) DEVICE — SPONGE LAP 18X18" X8435

## (undated) DEVICE — GLOVE PROTEXIS W/NEU-THERA 8.0  2D73TE80

## (undated) DEVICE — BLADE SAW RECIP STRK 70X6X0.025MM 0277-096-250S5

## (undated) DEVICE — BONE CLEANING TIP INTERPULSE  0210-010-000

## (undated) DEVICE — SUCTION IRR SYSTEM W/O TIP INTERPULSE HANDPIECE 0210-100-000

## (undated) DEVICE — LINEN TOWEL PACK X5 5464

## (undated) DEVICE — SU VICRYL 0 CT 36" J358H

## (undated) DEVICE — POSITIONER ABDUCTION PILLOW FOAM MED FP-ABDUCTM

## (undated) DEVICE — SUCTION MANIFOLD DORNOCH ULTRA CART UL-CL500

## (undated) DEVICE — HOOD FLYTE W/PEELAWAY 408-800-100

## (undated) DEVICE — DRSG KERLIX 4 1/2"X4YDS ROLL 6730

## (undated) DEVICE — SOL NACL 0.9% IRRIG 1000ML BOTTLE 2F7124

## (undated) DEVICE — LINEN ORTHO PACK 5446

## (undated) RX ORDER — GABAPENTIN 100 MG/1
CAPSULE ORAL
Status: DISPENSED
Start: 2019-04-15

## (undated) RX ORDER — ONDANSETRON 2 MG/ML
INJECTION INTRAMUSCULAR; INTRAVENOUS
Status: DISPENSED
Start: 2019-04-15

## (undated) RX ORDER — HYDROMORPHONE HYDROCHLORIDE 1 MG/ML
INJECTION, SOLUTION INTRAMUSCULAR; INTRAVENOUS; SUBCUTANEOUS
Status: DISPENSED
Start: 2019-04-15

## (undated) RX ORDER — ACETAMINOPHEN 325 MG/1
TABLET ORAL
Status: DISPENSED
Start: 2019-04-15

## (undated) RX ORDER — FENTANYL CITRATE 50 UG/ML
INJECTION, SOLUTION INTRAMUSCULAR; INTRAVENOUS
Status: DISPENSED
Start: 2019-04-15

## (undated) RX ORDER — LIDOCAINE HYDROCHLORIDE 20 MG/ML
INJECTION, SOLUTION EPIDURAL; INFILTRATION; INTRACAUDAL; PERINEURAL
Status: DISPENSED
Start: 2019-04-15

## (undated) RX ORDER — PROPOFOL 10 MG/ML
INJECTION, EMULSION INTRAVENOUS
Status: DISPENSED
Start: 2019-04-15

## (undated) RX ORDER — CLINDAMYCIN PHOSPHATE 900 MG/50ML
INJECTION, SOLUTION INTRAVENOUS
Status: DISPENSED
Start: 2019-04-15

## (undated) RX ORDER — DEXAMETHASONE SODIUM PHOSPHATE 4 MG/ML
INJECTION, SOLUTION INTRA-ARTICULAR; INTRALESIONAL; INTRAMUSCULAR; INTRAVENOUS; SOFT TISSUE
Status: DISPENSED
Start: 2019-04-15